# Patient Record
Sex: FEMALE | Race: WHITE | NOT HISPANIC OR LATINO | Employment: UNEMPLOYED | URBAN - METROPOLITAN AREA
[De-identification: names, ages, dates, MRNs, and addresses within clinical notes are randomized per-mention and may not be internally consistent; named-entity substitution may affect disease eponyms.]

---

## 2018-07-10 ENCOUNTER — HISTORICAL (OUTPATIENT)
Dept: LAB | Facility: HOSPITAL | Age: 33
End: 2018-07-10

## 2018-07-11 ENCOUNTER — HISTORICAL (OUTPATIENT)
Dept: LAB | Facility: HOSPITAL | Age: 33
End: 2018-07-11

## 2018-07-23 ENCOUNTER — HISTORICAL (OUTPATIENT)
Dept: RADIOLOGY | Facility: HOSPITAL | Age: 33
End: 2018-07-23

## 2018-08-07 ENCOUNTER — HISTORICAL (OUTPATIENT)
Dept: RADIOLOGY | Facility: HOSPITAL | Age: 33
End: 2018-08-07

## 2018-08-09 ENCOUNTER — HISTORICAL (OUTPATIENT)
Dept: LAB | Facility: HOSPITAL | Age: 33
End: 2018-08-09

## 2018-12-10 ENCOUNTER — HISTORICAL (OUTPATIENT)
Dept: RADIOLOGY | Facility: HOSPITAL | Age: 33
End: 2018-12-10

## 2019-07-10 ENCOUNTER — HISTORICAL (OUTPATIENT)
Dept: RADIOLOGY | Facility: HOSPITAL | Age: 34
End: 2019-07-10

## 2019-07-25 ENCOUNTER — HISTORICAL (OUTPATIENT)
Dept: RADIOLOGY | Facility: HOSPITAL | Age: 34
End: 2019-07-25

## 2019-08-09 ENCOUNTER — HISTORICAL (OUTPATIENT)
Dept: ANESTHESIOLOGY | Facility: HOSPITAL | Age: 34
End: 2019-08-09

## 2019-10-23 ENCOUNTER — HISTORICAL (OUTPATIENT)
Dept: RADIOLOGY | Facility: HOSPITAL | Age: 34
End: 2019-10-23

## 2020-02-27 ENCOUNTER — HISTORICAL (OUTPATIENT)
Dept: RADIOLOGY | Facility: HOSPITAL | Age: 35
End: 2020-02-27

## 2020-03-11 ENCOUNTER — HISTORICAL (OUTPATIENT)
Dept: RADIOLOGY | Facility: HOSPITAL | Age: 35
End: 2020-03-11

## 2020-12-17 ENCOUNTER — HISTORICAL (OUTPATIENT)
Dept: ADMINISTRATIVE | Facility: HOSPITAL | Age: 35
End: 2020-12-17

## 2021-12-20 ENCOUNTER — HISTORICAL (OUTPATIENT)
Dept: ADMINISTRATIVE | Facility: HOSPITAL | Age: 36
End: 2021-12-20

## 2022-01-31 LAB
HUMAN PAPILLOMAVIRUS (HPV): NORMAL
PAP RECOMMENDATION EXT: NORMAL
PAP SMEAR: NORMAL

## 2022-04-09 ENCOUNTER — HISTORICAL (OUTPATIENT)
Dept: ADMINISTRATIVE | Facility: HOSPITAL | Age: 37
End: 2022-04-09

## 2022-04-25 VITALS
SYSTOLIC BLOOD PRESSURE: 110 MMHG | BODY MASS INDEX: 39.78 KG/M2 | HEIGHT: 64 IN | DIASTOLIC BLOOD PRESSURE: 78 MMHG | WEIGHT: 233 LBS

## 2022-09-20 ENCOUNTER — HISTORICAL (OUTPATIENT)
Dept: ADMINISTRATIVE | Facility: HOSPITAL | Age: 37
End: 2022-09-20

## 2022-09-20 LAB — BCS RECOMMENDATION EXT: NORMAL

## 2022-10-06 ENCOUNTER — HISTORICAL (OUTPATIENT)
Dept: ADMINISTRATIVE | Facility: HOSPITAL | Age: 37
End: 2022-10-06

## 2023-01-17 ENCOUNTER — DOCUMENTATION ONLY (OUTPATIENT)
Dept: ADMINISTRATIVE | Facility: HOSPITAL | Age: 38
End: 2023-01-17
Payer: MEDICAID

## 2023-03-10 ENCOUNTER — OFFICE VISIT (OUTPATIENT)
Dept: OBSTETRICS AND GYNECOLOGY | Facility: CLINIC | Age: 38
End: 2023-03-10
Payer: MEDICAID

## 2023-03-10 VITALS
SYSTOLIC BLOOD PRESSURE: 128 MMHG | HEIGHT: 64 IN | WEIGHT: 232.63 LBS | DIASTOLIC BLOOD PRESSURE: 72 MMHG | BODY MASS INDEX: 39.72 KG/M2 | TEMPERATURE: 98 F

## 2023-03-10 DIAGNOSIS — Z80.0 FAMILY HISTORY OF COLON CANCER: ICD-10-CM

## 2023-03-10 DIAGNOSIS — Z01.411 ABNORMAL GYNECOLOGICAL EXAMINATION: Primary | ICD-10-CM

## 2023-03-10 DIAGNOSIS — N60.12 BILATERAL FIBROCYSTIC BREAST CHANGES: ICD-10-CM

## 2023-03-10 DIAGNOSIS — Z80.49 FAMILY HISTORY OF UTERINE CANCER: ICD-10-CM

## 2023-03-10 DIAGNOSIS — Z98.890 S/P ENDOMETRIAL ABLATION: ICD-10-CM

## 2023-03-10 DIAGNOSIS — Z98.51 S/P TUBAL LIGATION: ICD-10-CM

## 2023-03-10 DIAGNOSIS — Z12.4 CERVICAL CANCER SCREENING: ICD-10-CM

## 2023-03-10 DIAGNOSIS — N60.11 BILATERAL FIBROCYSTIC BREAST CHANGES: ICD-10-CM

## 2023-03-10 DIAGNOSIS — Z80.3 FAMILY HISTORY OF BREAST CANCER: ICD-10-CM

## 2023-03-10 PROCEDURE — 1160F PR REVIEW ALL MEDS BY PRESCRIBER/CLIN PHARMACIST DOCUMENTED: ICD-10-PCS | Mod: CPTII,,, | Performed by: NURSE PRACTITIONER

## 2023-03-10 PROCEDURE — 3078F PR MOST RECENT DIASTOLIC BLOOD PRESSURE < 80 MM HG: ICD-10-PCS | Mod: CPTII,,, | Performed by: NURSE PRACTITIONER

## 2023-03-10 PROCEDURE — 99395 PR PREVENTIVE VISIT,EST,18-39: ICD-10-PCS | Mod: ,,, | Performed by: NURSE PRACTITIONER

## 2023-03-10 PROCEDURE — 1159F MED LIST DOCD IN RCRD: CPT | Mod: CPTII,,, | Performed by: NURSE PRACTITIONER

## 2023-03-10 PROCEDURE — 1160F RVW MEDS BY RX/DR IN RCRD: CPT | Mod: CPTII,,, | Performed by: NURSE PRACTITIONER

## 2023-03-10 PROCEDURE — 3008F BODY MASS INDEX DOCD: CPT | Mod: CPTII,,, | Performed by: NURSE PRACTITIONER

## 2023-03-10 PROCEDURE — 3078F DIAST BP <80 MM HG: CPT | Mod: CPTII,,, | Performed by: NURSE PRACTITIONER

## 2023-03-10 PROCEDURE — 3074F SYST BP LT 130 MM HG: CPT | Mod: CPTII,,, | Performed by: NURSE PRACTITIONER

## 2023-03-10 PROCEDURE — 1159F PR MEDICATION LIST DOCUMENTED IN MEDICAL RECORD: ICD-10-PCS | Mod: CPTII,,, | Performed by: NURSE PRACTITIONER

## 2023-03-10 PROCEDURE — 3008F PR BODY MASS INDEX (BMI) DOCUMENTED: ICD-10-PCS | Mod: CPTII,,, | Performed by: NURSE PRACTITIONER

## 2023-03-10 PROCEDURE — 3074F PR MOST RECENT SYSTOLIC BLOOD PRESSURE < 130 MM HG: ICD-10-PCS | Mod: CPTII,,, | Performed by: NURSE PRACTITIONER

## 2023-03-10 PROCEDURE — 99395 PREV VISIT EST AGE 18-39: CPT | Mod: ,,, | Performed by: NURSE PRACTITIONER

## 2023-03-10 NOTE — PROGRESS NOTES
Chief Complaint: Annual exam    Chief Complaint   Patient presents with    Well Woman       HPI:   37 y.o. WF  s/p tubal and ablation, presents for an annual gyn exam.  S/p left breast bx, benign with Dr Tao .  No complaints today.    LMP:  no menses with Ablation.  Gardasil: x0    FmHx: +breast in MGM, + colon cancer in PGF, and +uterine cancer in mother, negative for ovarian cancer.     Labs / Significant Studies:  Last pap  WNL  Last MMG 2022 benign    Family History   Problem Relation Age of Onset    Uterine cancer Mother     Skin cancer Mother          History reviewed. No pertinent past medical history.  Past Surgical History:   Procedure Laterality Date    BREAST FIBROADENOMA SURGERY       SECTION      x2    DILATION AND CURETTAGE OF UTERUS      x2    KNEE CARTILAGE SURGERY      TONSILLECTOMY AND ADENOIDECTOMY       No current outpatient medications on file.    Review of patient's allergies indicates:  No Known Allergies    Social History     Tobacco Use    Smoking status: Never    Smokeless tobacco: Never   Substance Use Topics    Alcohol use: Yes    Drug use: Never         Review of Systems   Constitutional:  Negative for appetite change, chills, fatigue, fever and unexpected weight change.   Respiratory:  Negative for cough, shortness of breath and wheezing.    Cardiovascular:  Negative for chest pain, palpitations and leg swelling.   Gastrointestinal:  Negative for abdominal pain, blood in stool, constipation, diarrhea, nausea, vomiting and reflux.   Endocrine: Negative for diabetes, hair loss, hot flashes, hyperthyroidism and hypothyroidism.   Genitourinary:  Negative for bladder incontinence, decreased libido, dysmenorrhea, dyspareunia, dysuria, flank pain, frequency, genital sores, hematuria, hot flashes, menorrhagia, menstrual problem, pelvic pain, urgency, vaginal bleeding, vaginal discharge, vaginal pain, urinary incontinence, postcoital bleeding,  "postmenopausal bleeding, vaginal dryness and vaginal odor.   Integumentary:  Negative for rash, acne, hair changes, mole/lesion, breast mass, nipple discharge, breast skin changes and breast tenderness.   Neurological:  Negative for headaches.   Psychiatric/Behavioral:  Negative for depression and sleep disturbance. The patient is not nervous/anxious.    Breast: Negative for lump, mass, mastodynia, nipple discharge, skin changes and tenderness     Physical Exam:   Vitals:    03/10/23 0948   BP: 128/72   BP Location: Right arm   Temp: 97.5 °F (36.4 °C)   Weight: 105.5 kg (232 lb 9.6 oz)   Height: 5' 3.78" (1.62 m)       Body mass index is 40.2 kg/m².    Physical Exam  Constitutional:       Appearance: She is well-developed.   Neck:      Thyroid: No thyroid mass or thyroid tenderness.   Cardiovascular:      Rate and Rhythm: Normal rate and regular rhythm.      Pulses: Normal pulses.      Heart sounds: Normal heart sounds. No murmur heard.  Pulmonary:      Effort: No respiratory distress or retractions.      Breath sounds: Normal breath sounds. No decreased breath sounds, wheezing, rhonchi or rales.   Chest:   Breasts:     Right: No inverted nipple, mass, nipple discharge, skin change or tenderness.      Left: No inverted nipple, mass, nipple discharge, skin change or tenderness.      Comments: +fibrocystic changes  Abdominal:      General: Bowel sounds are normal.      Palpations: There is no mass.      Tenderness: There is no abdominal tenderness.      Hernia: No hernia is present.   Genitourinary:     Vagina: Normal. No vaginal discharge, erythema or tenderness.      Cervix: No discharge or friability.      Uterus: Not deviated, not enlarged, not fixed and not tender.       Adnexa:         Right: No mass, tenderness or fullness.          Left: No mass, tenderness or fullness.        Rectum: No tenderness or external hemorrhoid.   Musculoskeletal:      Cervical back: No edema.      Right lower leg: No edema.      " Left lower leg: No edema.   Lymphadenopathy:      Head:      Right side of head: No submandibular or preauricular adenopathy.      Left side of head: No submandibular or preauricular adenopathy.      Upper Body:      Right upper body: No supraclavicular or axillary adenopathy.      Left upper body: No supraclavicular or axillary adenopathy.   Skin:     General: Skin is warm and dry.      Coloration: Skin is not pale.      Findings: No erythema or rash.   Neurological:      Mental Status: She is alert and oriented to person, place, and time.   Psychiatric:         Mood and Affect: Mood normal. Mood is not anxious or depressed.         Behavior: Behavior normal.         Thought Content: Thought content normal. Thought content does not include homicidal or suicidal ideation. Thought content does not include homicidal or suicidal plan.       Assessment:     There is no problem list on file for this patient.      Health Maintenance Due   Topic Date Due    Hepatitis C Screening  Never done    Lipid Panel  Never done    HIV Screening  Never done    TETANUS VACCINE  Never done    Hemoglobin A1c (Diabetic Prevention Screening)  Never done    COVID-19 Vaccine (4 - Booster for Pfizer series) 04/28/2022    Influenza Vaccine (1) Never done     Health Maintenance Topics with due status: Not Due       Topic Last Completion Date    Cervical Cancer Screening 01/31/2022         Plan:    Luz was seen today for well woman.    Diagnoses and all orders for this visit:    Abnormal gynecological examination  -     Mammo Digital Screening Bilat w/ Zheng; Future  PAP  Counseled regarding safe sex practices and prevention of STD's .  Discussed contraception options.  Advised avoidance of tobacco, alcohol, and drugs.  Discussed breast self-awareness  Seat belt  Multivitamin, Ca/Vit D  Healthy diet and exercise  RTC 1 yr    Family history of breast cancer  -     Mammo Digital Screening Bilat w/ Zheng; Future  - Discussed recommendations of  annual screening after age of 40 with mammogram and MRI for patients with lifetime risk greater than 25%     - Explained that screening is not 100% reliable.  Advised patient if she notices any changes to her breast including a lump, mass, dimpling, discharge, rash, or tenderness she should contact us immediately.       Bilateral fibrocystic breast changes  -     Mammo Digital Screening Bilat w/ Zheng; Future  - Recommend monthly BSE     Family history of colon cancer  - Educated    - Advised pt to continue with Colonoscopy per PCP.     Family history of uterine cancer    S/P tubal ligation    S/P endometrial ablation    Cervical cancer screening        Marilee Diaz

## 2023-03-15 LAB
AGE GDLN ACOG TESTING: NORMAL
CYTOLOGIST CVX/VAG CYTO: NORMAL
CYTOLOGY CVX/VAG DOC CYTO: NORMAL
CYTOLOGY CVX/VAG DOC THIN PREP: NORMAL
DX ICD CODE: NORMAL
HPV I/H RISK 4 DNA CVX QL PROBE+SIG AMP: NEGATIVE
LC HPV GENOTYPE REFLEX: NORMAL
Lab: NORMAL
OTHER STN SPEC: NORMAL
STAT OF ADQ CVX/VAG CYTO-IMP: NORMAL

## 2023-03-16 ENCOUNTER — PATIENT MESSAGE (OUTPATIENT)
Dept: OBSTETRICS AND GYNECOLOGY | Facility: CLINIC | Age: 38
End: 2023-03-16
Payer: MEDICAID

## 2023-03-16 ENCOUNTER — HOSPITAL ENCOUNTER (OUTPATIENT)
Dept: RADIOLOGY | Facility: HOSPITAL | Age: 38
Discharge: HOME | End: 2023-03-16
Attending: NURSE PRACTITIONER
Payer: MEDICAID

## 2023-03-16 ENCOUNTER — TELEPHONE (OUTPATIENT)
Dept: OBSTETRICS AND GYNECOLOGY | Facility: CLINIC | Age: 38
End: 2023-03-16
Payer: MEDICAID

## 2023-03-16 VITALS — HEIGHT: 62 IN | WEIGHT: 231 LBS | BODY MASS INDEX: 42.51 KG/M2

## 2023-03-16 DIAGNOSIS — Z80.3 FAMILY HISTORY OF BREAST CANCER: ICD-10-CM

## 2023-03-16 DIAGNOSIS — N60.11 BILATERAL FIBROCYSTIC BREAST CHANGES: ICD-10-CM

## 2023-03-16 DIAGNOSIS — Z01.411 ABNORMAL GYNECOLOGICAL EXAMINATION: ICD-10-CM

## 2023-03-16 DIAGNOSIS — N60.12 BILATERAL FIBROCYSTIC BREAST CHANGES: ICD-10-CM

## 2023-03-16 PROCEDURE — 77067 SCR MAMMO BI INCL CAD: CPT | Mod: TC

## 2023-03-16 NOTE — TELEPHONE ENCOUNTER
----- Message from Katherine Aquilino sent at 3/16/2023  9:40 AM CDT -----  Regarding: Results  Contact: Luz Fenton received her results on her pap through her esteban. She is asking if a nurse could call her back, and if Mrs Hunt reviewed them.   Call bethany [phone] (can call pt or message through pt portal)

## 2023-07-28 ENCOUNTER — LAB VISIT (OUTPATIENT)
Dept: LAB | Facility: HOSPITAL | Age: 38
End: 2023-07-28
Attending: PEDIATRICS
Payer: MEDICAID

## 2023-07-28 DIAGNOSIS — E78.00 PURE HYPERCHOLESTEROLEMIA: Primary | ICD-10-CM

## 2023-07-28 LAB
ALBUMIN SERPL-MCNC: 3.8 G/DL (ref 3.5–5)
ALBUMIN/GLOB SERPL: 1.2 RATIO (ref 1.1–2)
ALP SERPL-CCNC: 73 UNIT/L (ref 40–150)
ALT SERPL-CCNC: 44 UNIT/L (ref 0–55)
APPEARANCE UR: CLEAR
AST SERPL-CCNC: 26 UNIT/L (ref 5–34)
BASOPHILS # BLD AUTO: 0.03 X10(3)/MCL
BASOPHILS NFR BLD AUTO: 0.4 %
BILIRUB UR QL STRIP.AUTO: NEGATIVE
BILIRUBIN DIRECT+TOT PNL SERPL-MCNC: 0.3 MG/DL
BUN SERPL-MCNC: 8 MG/DL (ref 7–18.7)
CALCIUM SERPL-MCNC: 9.2 MG/DL (ref 8.4–10.2)
CHLORIDE SERPL-SCNC: 105 MMOL/L (ref 98–107)
CHOLEST SERPL-MCNC: 213 MG/DL
CHOLEST/HDLC SERPL: 5 {RATIO} (ref 0–5)
CO2 SERPL-SCNC: 27 MMOL/L (ref 22–29)
COLOR UR: YELLOW
CREAT SERPL-MCNC: 0.72 MG/DL (ref 0.55–1.02)
DEPRECATED CALCIDIOL+CALCIFEROL SERPL-MC: 40.5 NG/ML (ref 30–80)
EOSINOPHIL # BLD AUTO: 0.29 X10(3)/MCL (ref 0–0.9)
EOSINOPHIL NFR BLD AUTO: 3.5 %
ERYTHROCYTE [DISTWIDTH] IN BLOOD BY AUTOMATED COUNT: 12.8 % (ref 11.5–17)
EST. AVERAGE GLUCOSE BLD GHB EST-MCNC: 93.9 MG/DL
GFR SERPLBLD CREATININE-BSD FMLA CKD-EPI: >60 MLS/MIN/1.73/M2
GLOBULIN SER-MCNC: 3.3 GM/DL (ref 2.4–3.5)
GLUCOSE SERPL-MCNC: 90 MG/DL (ref 74–100)
GLUCOSE UR QL STRIP.AUTO: NEGATIVE
HBA1C MFR BLD: 4.9 %
HCT VFR BLD AUTO: 39.8 % (ref 37–47)
HDLC SERPL-MCNC: 44 MG/DL (ref 35–60)
HGB BLD-MCNC: 13.2 G/DL (ref 12–16)
IMM GRANULOCYTES # BLD AUTO: 0.03 X10(3)/MCL (ref 0–0.04)
IMM GRANULOCYTES NFR BLD AUTO: 0.4 %
KETONES UR QL STRIP.AUTO: NEGATIVE
LDLC SERPL CALC-MCNC: 134 MG/DL (ref 50–140)
LEUKOCYTE ESTERASE UR QL STRIP.AUTO: NEGATIVE
LYMPHOCYTES # BLD AUTO: 3.04 X10(3)/MCL (ref 0.6–4.6)
LYMPHOCYTES NFR BLD AUTO: 36.8 %
MCH RBC QN AUTO: 28.4 PG (ref 27–31)
MCHC RBC AUTO-ENTMCNC: 33.2 G/DL (ref 33–36)
MCV RBC AUTO: 85.8 FL (ref 80–94)
MONOCYTES # BLD AUTO: 0.53 X10(3)/MCL (ref 0.1–1.3)
MONOCYTES NFR BLD AUTO: 6.4 %
NEUTROPHILS # BLD AUTO: 4.35 X10(3)/MCL (ref 2.1–9.2)
NEUTROPHILS NFR BLD AUTO: 52.5 %
NITRITE UR QL STRIP.AUTO: NEGATIVE
PH UR STRIP.AUTO: 5.5 [PH]
PLATELET # BLD AUTO: 319 X10(3)/MCL (ref 130–400)
PMV BLD AUTO: 10.9 FL (ref 7.4–10.4)
POTASSIUM SERPL-SCNC: 4 MMOL/L (ref 3.5–5.1)
PROT SERPL-MCNC: 7.1 GM/DL (ref 6.4–8.3)
PROT UR QL STRIP.AUTO: NEGATIVE
RBC # BLD AUTO: 4.64 X10(6)/MCL (ref 4.2–5.4)
RBC UR QL AUTO: NEGATIVE
SODIUM SERPL-SCNC: 139 MMOL/L (ref 136–145)
SP GR UR STRIP.AUTO: 1.02
T4 FREE SERPL-MCNC: 1.03 NG/DL (ref 0.7–1.48)
TRIGL SERPL-MCNC: 176 MG/DL (ref 37–140)
TSH SERPL-ACNC: 2.31 UIU/ML (ref 0.35–4.94)
UROBILINOGEN UR STRIP-ACNC: 0.2
VLDLC SERPL CALC-MCNC: 35 MG/DL
WBC # SPEC AUTO: 8.27 X10(3)/MCL (ref 4.5–11.5)

## 2023-07-28 PROCEDURE — 85025 COMPLETE CBC W/AUTO DIFF WBC: CPT

## 2023-07-28 PROCEDURE — 36415 COLL VENOUS BLD VENIPUNCTURE: CPT

## 2023-07-28 PROCEDURE — 81003 URINALYSIS AUTO W/O SCOPE: CPT

## 2023-07-28 PROCEDURE — 80053 COMPREHEN METABOLIC PANEL: CPT

## 2023-07-28 PROCEDURE — 80061 LIPID PANEL: CPT

## 2023-07-28 PROCEDURE — 84443 ASSAY THYROID STIM HORMONE: CPT

## 2023-07-28 PROCEDURE — 82306 VITAMIN D 25 HYDROXY: CPT

## 2023-07-28 PROCEDURE — 84439 ASSAY OF FREE THYROXINE: CPT

## 2023-07-28 PROCEDURE — 83036 HEMOGLOBIN GLYCOSYLATED A1C: CPT

## 2024-01-30 ENCOUNTER — LAB VISIT (OUTPATIENT)
Dept: LAB | Facility: HOSPITAL | Age: 39
End: 2024-01-30
Attending: FAMILY MEDICINE
Payer: MEDICAID

## 2024-01-30 DIAGNOSIS — R03.0 ELEVATED BLOOD PRESSURE READING WITHOUT DIAGNOSIS OF HYPERTENSION: Primary | ICD-10-CM

## 2024-01-30 LAB
ALBUMIN SERPL-MCNC: 4 G/DL (ref 3.5–5)
ALBUMIN/GLOB SERPL: 1.4 RATIO (ref 1.1–2)
ALP SERPL-CCNC: 52 UNIT/L (ref 40–150)
ALT SERPL-CCNC: 28 UNIT/L (ref 0–55)
AST SERPL-CCNC: 19 UNIT/L (ref 5–34)
BASOPHILS # BLD AUTO: 0.03 X10(3)/MCL
BASOPHILS NFR BLD AUTO: 0.5 %
BILIRUB SERPL-MCNC: 0.6 MG/DL
BUN SERPL-MCNC: 7 MG/DL (ref 7–18.7)
CALCIUM SERPL-MCNC: 8.7 MG/DL (ref 8.4–10.2)
CHLORIDE SERPL-SCNC: 105 MMOL/L (ref 98–107)
CHOLEST SERPL-MCNC: 196 MG/DL
CHOLEST/HDLC SERPL: 5 {RATIO} (ref 0–5)
CO2 SERPL-SCNC: 29 MMOL/L (ref 22–29)
CREAT SERPL-MCNC: 0.65 MG/DL (ref 0.55–1.02)
EOSINOPHIL # BLD AUTO: 0.19 X10(3)/MCL (ref 0–0.9)
EOSINOPHIL NFR BLD AUTO: 3.2 %
ERYTHROCYTE [DISTWIDTH] IN BLOOD BY AUTOMATED COUNT: 12 % (ref 11.5–17)
GFR SERPLBLD CREATININE-BSD FMLA CKD-EPI: >60 MLS/MIN/1.73/M2
GLOBULIN SER-MCNC: 2.8 GM/DL (ref 2.4–3.5)
GLUCOSE SERPL-MCNC: 86 MG/DL (ref 74–100)
HCT VFR BLD AUTO: 36 % (ref 37–47)
HDLC SERPL-MCNC: 43 MG/DL (ref 35–60)
HGB BLD-MCNC: 13 G/DL (ref 12–16)
IMM GRANULOCYTES # BLD AUTO: 0.02 X10(3)/MCL (ref 0–0.04)
IMM GRANULOCYTES NFR BLD AUTO: 0.3 %
LDLC SERPL CALC-MCNC: 135 MG/DL (ref 50–140)
LYMPHOCYTES # BLD AUTO: 2.27 X10(3)/MCL (ref 0.6–4.6)
LYMPHOCYTES NFR BLD AUTO: 38.5 %
MCH RBC QN AUTO: 29 PG (ref 27–31)
MCHC RBC AUTO-ENTMCNC: 36.1 G/DL (ref 33–36)
MCV RBC AUTO: 80.4 FL (ref 80–94)
MONOCYTES # BLD AUTO: 0.45 X10(3)/MCL (ref 0.1–1.3)
MONOCYTES NFR BLD AUTO: 7.6 %
NEUTROPHILS # BLD AUTO: 2.93 X10(3)/MCL (ref 2.1–9.2)
NEUTROPHILS NFR BLD AUTO: 49.9 %
PLATELET # BLD AUTO: 262 X10(3)/MCL (ref 130–400)
PMV BLD AUTO: 11.3 FL (ref 7.4–10.4)
POTASSIUM SERPL-SCNC: 3.7 MMOL/L (ref 3.5–5.1)
PROT SERPL-MCNC: 6.8 GM/DL (ref 6.4–8.3)
RBC # BLD AUTO: 4.48 X10(6)/MCL (ref 4.2–5.4)
SODIUM SERPL-SCNC: 140 MMOL/L (ref 136–145)
TRIGL SERPL-MCNC: 92 MG/DL (ref 37–140)
TSH SERPL-ACNC: 2.15 UIU/ML (ref 0.35–4.94)
VLDLC SERPL CALC-MCNC: 18 MG/DL
WBC # SPEC AUTO: 5.89 X10(3)/MCL (ref 4.5–11.5)

## 2024-01-30 PROCEDURE — 80061 LIPID PANEL: CPT

## 2024-01-30 PROCEDURE — 36415 COLL VENOUS BLD VENIPUNCTURE: CPT

## 2024-01-30 PROCEDURE — 85025 COMPLETE CBC W/AUTO DIFF WBC: CPT

## 2024-01-30 PROCEDURE — 80053 COMPREHEN METABOLIC PANEL: CPT

## 2024-01-30 PROCEDURE — 84443 ASSAY THYROID STIM HORMONE: CPT

## 2024-02-21 NOTE — PROGRESS NOTES
Chief Complaint:     Chief Complaint   Patient presents with    Breast Pain     L and R breast lump x 1 month. Reports has intermittent L breast tenderness when palpated. Denies pain or tenderness of R breast . Last pap: 03/10/2023 NIL -HPV. MM2023 benign. S/p tubal and ablation.          HPI:   38 y.o.  female   presents with c/o lumps to both breasts x 1 month.  MGM with hx breast cancer.  Last MMG 3/23. Hx left breast bx , benign.   Hx fibroadenoma left breast, unsure of year.    LMP: S/p ablation  Frequency: N/A  Cycle length: N/A  Flow: N/A  Intermenstrual bleeding: No  Postcoital bleeding: No  Dysmenorrhea: N/A  Sexually active: Yes  Dyspareunia: No  Contraception: S/p tubal ligation and ablation  H/o STI: No   Last pap: 03/10/2023 NIL -HPV  H/o abnl pap: No  Colposcopy:       Current Outpatient Medications:     topiramate (TOPAMAX) 25 MG tablet, Take 25 mg by mouth 2 (two) times daily., Disp: , Rfl:     valsartan (DIOVAN) 80 MG tablet, Take 80 mg by mouth., Disp: , Rfl:     phentermine (ADIPEX-P) 37.5 mg tablet, Take 37.5 mg by mouth before breakfast., Disp: , Rfl:     Review of patient's allergies indicates:  No Known Allergies    Social History     Tobacco Use    Smoking status: Never    Smokeless tobacco: Never   Substance Use Topics    Alcohol use: Yes    Drug use: Never       Review of Systems:   General/Constitutional: Chills denies. Fatigue/weakness denies. Fever denies. Night sweats denies. Hot flashes denies    Respiratory: Cough denies. Hemoptysis denies. SOB denies. Sputum production denies. Wheezing denies .   Cardiovascular: Chest pain denies . Dizziness denies. Palpitations denies. Swelling in hands/feet denies    Gastrointestinal: Abdominal pain denies. Blood in stool denies. Constipation denies. Diarrhea denies. Heartburn denies. Nausea denies. Vomiting denies    Genitourinary: Incontinence denies. Blood in urine denies. Frequent urination denies. Painful urination denies.  "Urinary urgency denies. Nocturia denies    Gynecologic: Irregular menses denies. Heavy bleeding denies. Painful menses denies. Vaginal discharge denies. Vaginal odor denies. Vaginal itching denies. Vaginal lesion denies. Pelvic pain denies. Decreased libido denies. Vulvar lesion denies. Prolapse of genital organs denies. Painful intercourse denies. Postcoital bleeding denies    Psychiatric: Depression denies. Anxiety denies       Physical Exam:   Vitals:    02/22/24 0808   BP: 114/76   BP Location: Right arm   Patient Position: Sitting   BP Method: Medium (Manual)   Weight: 93.4 kg (206 lb)   Height: 5' 4" (1.626 m)       Body mass index is 35.36 kg/m².    Physical Exam  Chest:   Breasts:     Breasts are symmetrical.      Right: No inverted nipple, mass, nipple discharge, skin change or tenderness.      Left: No inverted nipple, mass, nipple discharge, skin change or tenderness.      Comments: + fibrocystic changes noted bilaterally            Assessment:     There is no problem list on file for this patient.      Health Maintenance Due   Topic Date Due    Hepatitis C Screening  Never done    HIV Screening  Never done    TETANUS VACCINE  Never done    Influenza Vaccine (1) Never done    COVID-19 Vaccine (4 - 2023-24 season) 09/01/2023     Health Maintenance Topics with due status: Not Due       Topic Last Completion Date    Cervical Cancer Screening 03/10/2023    Hemoglobin A1c (Diabetic Prevention Screening) 07/28/2023           Plan:    Luz was seen today for breast pain.    Diagnoses and all orders for this visit:    Bilateral fibrocystic breast disease (FCBD)  - Discussed recommendations of annual screening after age of 40 with mammogram and MRI for patients with lifetime risk greater than 25%     - Explained that screening is not 100% reliable.  Advised patient if she notices any changes to her breast including a lump, mass, dimpling, discharge, rash, or tenderness she should contact us immediately.     - " Recommend monthly BSE      - Advised pt to decrease caffeine intake (e.g. soda, coffee, tea, chocolate, etc.)    - Advised pt to use Aspercreme PRN.     Family history of breast cancer    Mastodynia of left breast  Aspercreme prn  Discussed with patient treatment options including compression/sports bra, vitamin E, decrease caffeine intake, NSAIDs, and primrose oil.        Explained that screening is not 100% reliable.  Advised patient if she notices any changes to her breast including a lump, mass, dimpling, discharge, rash, or tenderness she should contact us immediately.          Diagnostic MMG and u/s - orders sent  RTC 1 month for annual and breast f/u

## 2024-02-22 ENCOUNTER — OFFICE VISIT (OUTPATIENT)
Dept: OBSTETRICS AND GYNECOLOGY | Facility: CLINIC | Age: 39
End: 2024-02-22
Payer: MEDICAID

## 2024-02-22 VITALS
BODY MASS INDEX: 35.17 KG/M2 | DIASTOLIC BLOOD PRESSURE: 76 MMHG | WEIGHT: 206 LBS | HEIGHT: 64 IN | SYSTOLIC BLOOD PRESSURE: 114 MMHG

## 2024-02-22 DIAGNOSIS — N64.4 MASTODYNIA OF LEFT BREAST: ICD-10-CM

## 2024-02-22 DIAGNOSIS — Z80.3 FAMILY HISTORY OF BREAST CANCER: ICD-10-CM

## 2024-02-22 DIAGNOSIS — N60.12 BILATERAL FIBROCYSTIC BREAST DISEASE (FCBD): Primary | ICD-10-CM

## 2024-02-22 DIAGNOSIS — N60.11 BILATERAL FIBROCYSTIC BREAST DISEASE (FCBD): Primary | ICD-10-CM

## 2024-02-22 PROCEDURE — 3008F BODY MASS INDEX DOCD: CPT | Mod: CPTII,,, | Performed by: NURSE PRACTITIONER

## 2024-02-22 PROCEDURE — 1159F MED LIST DOCD IN RCRD: CPT | Mod: CPTII,,, | Performed by: NURSE PRACTITIONER

## 2024-02-22 PROCEDURE — 3074F SYST BP LT 130 MM HG: CPT | Mod: CPTII,,, | Performed by: NURSE PRACTITIONER

## 2024-02-22 PROCEDURE — 99213 OFFICE O/P EST LOW 20 MIN: CPT | Mod: ,,, | Performed by: NURSE PRACTITIONER

## 2024-02-22 PROCEDURE — 4010F ACE/ARB THERAPY RXD/TAKEN: CPT | Mod: CPTII,,, | Performed by: NURSE PRACTITIONER

## 2024-02-22 PROCEDURE — 3078F DIAST BP <80 MM HG: CPT | Mod: CPTII,,, | Performed by: NURSE PRACTITIONER

## 2024-02-22 PROCEDURE — 1160F RVW MEDS BY RX/DR IN RCRD: CPT | Mod: CPTII,,, | Performed by: NURSE PRACTITIONER

## 2024-02-22 RX ORDER — PHENTERMINE HYDROCHLORIDE 37.5 MG/1
37.5 TABLET ORAL
COMMUNITY

## 2024-02-22 RX ORDER — TOPIRAMATE 25 MG/1
25 TABLET ORAL 2 TIMES DAILY
COMMUNITY
Start: 2023-10-31 | End: 2024-05-03

## 2024-02-22 RX ORDER — VALSARTAN 80 MG/1
80 TABLET ORAL
COMMUNITY
Start: 2024-01-25

## 2024-02-25 ENCOUNTER — HOSPITAL ENCOUNTER (EMERGENCY)
Facility: HOSPITAL | Age: 39
Discharge: HOME | End: 2024-02-25
Payer: MEDICAID

## 2024-02-25 VITALS
WEIGHT: 210.88 LBS | HEIGHT: 64 IN | RESPIRATION RATE: 20 BRPM | OXYGEN SATURATION: 95 % | DIASTOLIC BLOOD PRESSURE: 67 MMHG | SYSTOLIC BLOOD PRESSURE: 112 MMHG | HEART RATE: 95 BPM | TEMPERATURE: 98 F | BODY MASS INDEX: 36 KG/M2

## 2024-02-25 DIAGNOSIS — R10.13 EPIGASTRIC PAIN: ICD-10-CM

## 2024-02-25 DIAGNOSIS — N30.00 ACUTE CYSTITIS WITHOUT HEMATURIA: Primary | ICD-10-CM

## 2024-02-25 LAB
ALBUMIN SERPL-MCNC: 4.5 G/DL (ref 3.4–5)
ALBUMIN/GLOB SERPL: 1.5 RATIO
ALP SERPL-CCNC: 64 UNIT/L (ref 50–144)
ALT SERPL-CCNC: 43 UNIT/L (ref 1–45)
ANION GAP SERPL CALC-SCNC: 6 MEQ/L (ref 2–13)
APPEARANCE UR: ABNORMAL
AST SERPL-CCNC: 32 UNIT/L (ref 14–36)
B-HCG SERPL QL: NEGATIVE
BACTERIA #/AREA URNS AUTO: ABNORMAL /HPF
BASOPHILS # BLD AUTO: 0.03 X10(3)/MCL (ref 0.01–0.08)
BASOPHILS NFR BLD AUTO: 0.2 % (ref 0.1–1.2)
BILIRUB SERPL-MCNC: 0.7 MG/DL (ref 0–1)
BILIRUB UR QL STRIP.AUTO: NEGATIVE
BUN SERPL-MCNC: 9 MG/DL (ref 7–20)
CALCIUM SERPL-MCNC: 8.8 MG/DL (ref 8.4–10.2)
CHLORIDE SERPL-SCNC: 105 MMOL/L (ref 98–110)
CO2 SERPL-SCNC: 27 MMOL/L (ref 21–32)
COLOR UR AUTO: YELLOW
CREAT SERPL-MCNC: 0.68 MG/DL (ref 0.66–1.25)
CREAT/UREA NIT SERPL: 13 (ref 12–20)
EOSINOPHIL # BLD AUTO: 0.2 X10(3)/MCL (ref 0.04–0.36)
EOSINOPHIL NFR BLD AUTO: 1.3 % (ref 0.7–7)
ERYTHROCYTE [DISTWIDTH] IN BLOOD BY AUTOMATED COUNT: 12.2 % (ref 11–14.5)
GFR SERPLBLD CREATININE-BSD FMLA CKD-EPI: >90 MLS/MIN/1.73/M2
GLOBULIN SER-MCNC: 3.1 GM/DL (ref 2–3.9)
GLUCOSE SERPL-MCNC: 94 MG/DL (ref 70–115)
GLUCOSE UR QL STRIP.AUTO: NEGATIVE
HCT VFR BLD AUTO: 37 % (ref 36–48)
HGB BLD-MCNC: 13.1 G/DL (ref 11.8–16)
IMM GRANULOCYTES # BLD AUTO: 0.04 X10(3)/MCL (ref 0–0.03)
IMM GRANULOCYTES NFR BLD AUTO: 0.3 % (ref 0–0.5)
KETONES UR QL STRIP.AUTO: NEGATIVE
LEUKOCYTE ESTERASE UR QL STRIP.AUTO: ABNORMAL
LIPASE SERPL-CCNC: 159 U/L (ref 23–300)
LYMPHOCYTES # BLD AUTO: 2.37 X10(3)/MCL (ref 1.16–3.74)
LYMPHOCYTES NFR BLD AUTO: 15.3 % (ref 20–55)
MCH RBC QN AUTO: 29.9 PG (ref 27–34)
MCHC RBC AUTO-ENTMCNC: 35.4 G/DL (ref 31–37)
MCV RBC AUTO: 84.5 FL (ref 79–99)
MONOCYTES # BLD AUTO: 0.86 X10(3)/MCL (ref 0.24–0.36)
MONOCYTES NFR BLD AUTO: 5.6 % (ref 4.7–12.5)
NEUTROPHILS # BLD AUTO: 11.97 X10(3)/MCL (ref 1.56–6.13)
NEUTROPHILS NFR BLD AUTO: 77.3 % (ref 37–73)
NITRITE UR QL STRIP.AUTO: POSITIVE
NRBC BLD AUTO-RTO: 0 %
PH UR STRIP.AUTO: 6 [PH]
PLATELET # BLD AUTO: 284 X10(3)/MCL (ref 140–371)
PMV BLD AUTO: 10.6 FL (ref 9.4–12.4)
POTASSIUM SERPL-SCNC: 3.4 MMOL/L (ref 3.5–5.1)
PROT SERPL-MCNC: 7.6 GM/DL (ref 6.3–8.2)
PROT UR QL STRIP.AUTO: 100
RBC # BLD AUTO: 4.38 X10(6)/MCL (ref 4–5.1)
RBC #/AREA URNS AUTO: ABNORMAL /HPF
RBC UR QL AUTO: ABNORMAL
SODIUM SERPL-SCNC: 138 MMOL/L (ref 135–145)
SP GR UR STRIP.AUTO: >=1.03 (ref 1–1.03)
SQUAMOUS #/AREA URNS AUTO: ABNORMAL /HPF
UROBILINOGEN UR STRIP-ACNC: 1
WBC # SPEC AUTO: 15.47 X10(3)/MCL (ref 4–11.5)
WBC #/AREA URNS AUTO: >100 /HPF

## 2024-02-25 PROCEDURE — 81025 URINE PREGNANCY TEST: CPT

## 2024-02-25 PROCEDURE — 25500020 PHARM REV CODE 255

## 2024-02-25 PROCEDURE — 93010 ELECTROCARDIOGRAM REPORT: CPT | Mod: ,,, | Performed by: INTERNAL MEDICINE

## 2024-02-25 PROCEDURE — 99285 EMERGENCY DEPT VISIT HI MDM: CPT | Mod: 25

## 2024-02-25 PROCEDURE — 80053 COMPREHEN METABOLIC PANEL: CPT

## 2024-02-25 PROCEDURE — 83690 ASSAY OF LIPASE: CPT

## 2024-02-25 PROCEDURE — 87086 URINE CULTURE/COLONY COUNT: CPT

## 2024-02-25 PROCEDURE — 63600175 PHARM REV CODE 636 W HCPCS

## 2024-02-25 PROCEDURE — 93005 ELECTROCARDIOGRAM TRACING: CPT

## 2024-02-25 PROCEDURE — 25000003 PHARM REV CODE 250

## 2024-02-25 PROCEDURE — 85025 COMPLETE CBC W/AUTO DIFF WBC: CPT

## 2024-02-25 PROCEDURE — 81003 URINALYSIS AUTO W/O SCOPE: CPT

## 2024-02-25 PROCEDURE — 96365 THER/PROPH/DIAG IV INF INIT: CPT

## 2024-02-25 PROCEDURE — 96375 TX/PRO/DX INJ NEW DRUG ADDON: CPT

## 2024-02-25 RX ORDER — NITROFURANTOIN 25; 75 MG/1; MG/1
100 CAPSULE ORAL 2 TIMES DAILY
Qty: 10 CAPSULE | Refills: 0 | Status: SHIPPED | OUTPATIENT
Start: 2024-02-25 | End: 2024-03-01

## 2024-02-25 RX ORDER — KETOROLAC TROMETHAMINE 30 MG/ML
30 INJECTION, SOLUTION INTRAMUSCULAR; INTRAVENOUS
Status: COMPLETED | OUTPATIENT
Start: 2024-02-25 | End: 2024-02-25

## 2024-02-25 RX ORDER — PANTOPRAZOLE SODIUM 40 MG/1
40 TABLET, DELAYED RELEASE ORAL DAILY
Qty: 30 TABLET | Refills: 0 | Status: SHIPPED | OUTPATIENT
Start: 2024-02-25 | End: 2024-05-03

## 2024-02-25 RX ORDER — PROCHLORPERAZINE EDISYLATE 5 MG/ML
5 INJECTION INTRAMUSCULAR; INTRAVENOUS
Status: COMPLETED | OUTPATIENT
Start: 2024-02-25 | End: 2024-02-25

## 2024-02-25 RX ORDER — LORAZEPAM 2 MG/ML
0.5 INJECTION INTRAMUSCULAR
Status: COMPLETED | OUTPATIENT
Start: 2024-02-25 | End: 2024-02-25

## 2024-02-25 RX ORDER — HYDROMORPHONE HYDROCHLORIDE 1 MG/ML
0.5 INJECTION, SOLUTION INTRAMUSCULAR; INTRAVENOUS; SUBCUTANEOUS
Status: COMPLETED | OUTPATIENT
Start: 2024-02-25 | End: 2024-02-25

## 2024-02-25 RX ADMIN — KETOROLAC TROMETHAMINE 30 MG: 30 INJECTION, SOLUTION INTRAMUSCULAR; INTRAVENOUS at 02:02

## 2024-02-25 RX ADMIN — PIPERACILLIN AND TAZOBACTAM 4.5 G: 4; .5 INJECTION, POWDER, LYOPHILIZED, FOR SOLUTION INTRAVENOUS; PARENTERAL at 02:02

## 2024-02-25 RX ADMIN — HYDROMORPHONE HYDROCHLORIDE 0.5 MG: 1 INJECTION, SOLUTION INTRAMUSCULAR; INTRAVENOUS; SUBCUTANEOUS at 01:02

## 2024-02-25 RX ADMIN — LORAZEPAM 0.5 MG: 2 INJECTION INTRAMUSCULAR; INTRAVENOUS at 01:02

## 2024-02-25 RX ADMIN — PROCHLORPERAZINE EDISYLATE 5 MG: 5 INJECTION INTRAMUSCULAR; INTRAVENOUS at 01:02

## 2024-02-25 RX ADMIN — IOHEXOL 100 ML: 350 INJECTION, SOLUTION INTRAVENOUS at 02:02

## 2024-02-25 NOTE — DISCHARGE INSTRUCTIONS
Your pain could have been due to a gallbladder attack or your stomach (gastritis).  Tests that were done rule out dangerous causes, but do not rule out either of these problems.  Take the medications as prescribed.  Avoid fatty foods.  Follow up with your primary care physician for further testing, as they feel indicated.

## 2024-02-25 NOTE — ED PROVIDER NOTES
Encounter Date: 2024       History     Chief Complaint   Patient presents with    Abdominal Pain    Back Pain    Nausea     [place] she [place] having Abdominal pain radiating to her back and constant Nausea.     38-year-old female presents complaining of epigastric pain radiating to her back.  The pain began this evening, and worsened after eating a fried pork chop and macaroni and cheese at 11:00 p.m..  Denies any similar pain in the past.  She is nauseated, but has not vomited.  She did not take anything for it.  She still has a gallbladder.    The history is provided by the patient and the spouse.     Review of patient's allergies indicates:  No Known Allergies  Past Medical History:   Diagnosis Date    Hypertension      Past Surgical History:   Procedure Laterality Date    BREAST FIBROADENOMA SURGERY Left      SECTION      ,     DILATION AND CURETTAGE OF UTERUS      , 2009    ENDOMETRIAL ABLATION  2010    INCISIONAL BREAST BIOPSY Left 10/06/2022    per Dr. Devorah Tao - +Benign Tissue w/ Ductal Hyperplasia, Fibrosis and dilated ducts    KNEE CARTILAGE SURGERY Right     placement of breast device Left     TONSILLECTOMY AND ADENOIDECTOMY      TUBAL LIGATION  2010     Family History   Problem Relation Age of Onset    Breast cancer Maternal Grandmother 86    Hypertension Father     Diabetes type II Father     Uterine cancer Mother 27    Skin cancer Mother     Hypertension Mother     Diabetes type II Mother     Colon cancer Neg Hx     Ovarian cancer Neg Hx     Cervical cancer Neg Hx      Social History     Tobacco Use    Smoking status: Never    Smokeless tobacco: Never   Substance Use Topics    Alcohol use: Yes    Drug use: Never     Review of Systems   Constitutional:  Negative for fever.   HENT:  Negative for sore throat.    Respiratory:  Negative for shortness of breath.    Cardiovascular:  Negative for chest pain.   Gastrointestinal:  Positive for  abdominal pain and nausea. Negative for vomiting.   Genitourinary:  Negative for dysuria.   Musculoskeletal:  Positive for back pain.   Skin:  Negative for rash.   Neurological:  Negative for weakness.   Hematological:  Does not bruise/bleed easily.   All other systems reviewed and are negative.      Physical Exam     Initial Vitals [02/25/24 0139]   BP Pulse Resp Temp SpO2   (!) 156/105 89 18 97.9 °F (36.6 °C) 99 %      MAP       --         Physical Exam    Nursing note and vitals reviewed.  Constitutional: Vital signs are normal. She appears well-developed and well-nourished. She is cooperative.   Elevated BMI, no apparent distress   HENT:   Head: Normocephalic and atraumatic.   Mouth/Throat: Oropharynx is clear and moist.   Eyes: Conjunctivae, EOM and lids are normal. Pupils are equal, round, and reactive to light.   Neck: Trachea normal. Neck supple.   Normal range of motion.  Cardiovascular:  Normal rate, regular rhythm, normal heart sounds and intact distal pulses.           Pulmonary/Chest: Breath sounds normal.   Abdominal: Abdomen is soft. Bowel sounds are normal. She exhibits no distension. There is abdominal tenderness.   Mild epigastric tenderness There is no rebound and no guarding.   Musculoskeletal:         General: Normal range of motion.      Cervical back: Normal, normal range of motion and neck supple.      Lumbar back: Normal.     Neurological: She is alert and oriented to person, place, and time. She has normal strength. Coordination normal. GCS score is 15. GCS eye subscore is 4. GCS verbal subscore is 5. GCS motor subscore is 6.   Skin: Skin is warm, dry and intact. Capillary refill takes less than 2 seconds.   Psychiatric: She has a normal mood and affect. Her speech is normal and behavior is normal. Judgment and thought content normal. Cognition and memory are normal.         ED Course   Procedures  Labs Reviewed   COMPREHENSIVE METABOLIC PANEL - Abnormal; Notable for the following  components:       Result Value    Potassium Level 3.4 (*)     All other components within normal limits   URINALYSIS, REFLEX TO URINE CULTURE - Abnormal; Notable for the following components:    Appearance, UA SL CLOUDY (*)     Protein,  (*)     Blood, UA Large (*)     Nitrites, UA Positive (*)     Leukocyte Esterase, UA Small (*)     All other components within normal limits    Narrative:      URINE STABILITY IS 2 HOURS AT ROOM TEMP OR    SIX HOURS REFRIGERATED. PERFORMING TESTING ON    SPECIMENS GREATER THAN THIS AGE MAY AFFECT THE    FOLLOWING TESTS:    PH          SPECIFIC GRAVITY           BLOOD    CLARITY     BILIRUBIN               UROBILINOGEN   CBC WITH DIFFERENTIAL - Abnormal; Notable for the following components:    WBC 15.47 (*)     Neut % 77.3 (*)     Lymph % 15.3 (*)     Neut # 11.97 (*)     Mono # 0.86 (*)     IG# 0.04 (*)     All other components within normal limits   URINALYSIS, MICROSCOPIC - Abnormal; Notable for the following components:    Bacteria, UA Many (*)     WBC, UA >100 (*)     Squamous Epithelial Cells, UA Few (*)     All other components within normal limits   LIPASE - Normal   HCG QUALITATIVE URINE - Normal   CULTURE, URINE   CBC W/ AUTO DIFFERENTIAL    Narrative:     The following orders were created for panel order CBC W/ AUTO DIFFERENTIAL.  Procedure                               Abnormality         Status                     ---------                               -----------         ------                     CBC with Differential[[id number]]       Abnormal            Final result                 Please view results for these tests on the individual orders.        ECG Results              EKG 12-lead (Preliminary result)  Result time 02/25/24 01:55:06      Wet Read by Carmelo Umana MD (02/25/24 01:55:06, [facility])    Normal sinus rhythm, heart rate 85, normal intervals, normal axis, normal P waves, normal QRS, normal ST  segments, normal T-waves, normal QT.  This is a normal EKG.                                  Imaging Results              CT Abdomen Pelvis With IV Contrast NO Oral Contrast (Preliminary result)  Result time 02/25/24 02:51:24      Preliminary result by Heri Reese MD (02/25/24 02:51:24)                   Narrative:    START OF REPORT:  Technique: CT of the abdomen and pelvis was performed with axial images as well as sagittal and coronal reconstruction images with intravenous contrast.    Comparison: None available.    Clinical History: TODAY - ABD PAIN RADIATING TO BACK W/ CONSTANT NAUSEA.    Dosage Information: Automated Exposure Control was utilized.    Findings:  Lines and Tubes: None.  Thorax:  Lungs: No focal infiltrate or consolidation is seen.  Pleura: No effusions or thickening.  Heart: The heart size is within normal limits.  Abdomen:  Abdominal Wall: No abdominal wall pathology is seen.  Liver: The liver appears unremarkable.  Biliary System: No intrahepatic or extrahepatic biliary duct dilatation is seen.  Gallbladder: This may reflect focal gallbladder adenomyomatosis.  Pancreas: The pancreas appears unremarkable.  Spleen: The spleen appears unremarkable.  Adrenals: The adrenal glands appear unremarkable.  Kidneys: The kidneys appear unremarkable with no stones cysts masses or hydronephrosis.  Aorta: The abdominal aorta appears unremarkable.  IVC: Unremarkable.  Bowel:  Esophagus: The visualized esophagus appears unremarkable.  Stomach: The stomach appears unremarkable.  Duodenum: Unremarkable appearing duodenum.  Small Bowel: The small bowel appears unremarkable.  Colon: Nondistended.  Appendix: The appendix appears unremarkable seen on Image 79, Series 3 through Image 72, Series 3.  Peritoneum: No intraperitoneal free air or ascites is seen.    Pelvis:  Bladder: The bladder is nondistended however the bladder wall appears thickened after considering state of nondistension which could reflect an  element of cystitis.  Female:  Uterus: The uterus appears unremarkable.  Ovaries: The ovaries appear unremarkable with probable bilateral physiologic cysts.    Bony structures:  Dorsal Spine: The visualized dorsal spine appears unremarkable.  Bony Pelvis: Sclerotic changes are seen along the articulating surface of the bilateral natali along the sacroiliac joints reflective of osteitis condensans ilii.      Impression:  1. The bladder is nondistended however the bladder wall appears thickened after considering state of nondistension which could reflect an element of cystitis. Correlate with clinical and laboratory findings.  2. No acute intraabdominal or pelvic solid organ or bowel pathology identified. Details and findings as discussed.                                         Medications   ketorolac injection 30 mg (30 mg Intravenous Given 2/25/24 0200)   HYDROmorphone injection 0.5 mg (0.5 mg Intravenous Given 2/25/24 0158)   LORazepam injection 0.5 mg (0.5 mg Intravenous Given 2/25/24 0158)   prochlorperazine injection Soln 5 mg (5 mg Intravenous Given 2/25/24 0159)   iohexoL (OMNIPAQUE 350) injection 100 mL (100 mLs Intravenous Given 2/25/24 0231)   piperacillin-tazobactam (ZOSYN) 4.5 g in dextrose 5 % in water (D5W) 100 mL IVPB (MB+) (0 g Intravenous Stopped 2/25/24 0258)     Medical Decision Making  Epigastric pain, radiating to back, worsened with fatty food  Differential diagnosis:  Cholecystitis, pancreatitis, gastritis, AAA/aortic dissection  Analgesia, antiemetics  Labs, CT    Amount and/or Complexity of Data Reviewed  Labs: ordered. Decision-making details documented in ED Course.  Radiology: ordered. Decision-making details documented in ED Course.    Risk  Prescription drug management.               ED Course as of 02/25/24 0259   Sun Feb 25, 2024   0206 CBC W/ AUTO DIFFERENTIAL(!)  Significant leukocytosis [TM]   0207 Urinalysis, Reflex to Urine Culture(!)  Dipstick looks like a UTI [TM]   0220 Comp.  Metabolic Panel(!)  Normal LFTs [TM]   0220 Urinalysis, Microscopic(!)  Pretty significant UTI [TM]   0254 CT Abdomen Pelvis With IV Contrast NO Oral Contrast  Bladder wall indicate cystitis, no other pathology found. [TM]      ED Course User Index  [TM] Carmelo Umana MD                           Clinical Impression:  Final diagnoses:  [R10.13] Epigastric pain  [N30.00] Acute cystitis without hematuria (Primary)          ED Disposition Condition    Discharge Good          ED Prescriptions       Medication Sig Dispense Start Date End Date Auth. Provider    nitrofurantoin, macrocrystal-monohydrate, (MACROBID) 100 MG capsule Take 1 capsule (100 mg total) by mouth 2 (two) times daily. for 5 days 10 capsule 2/25/2024 3/1/2024 Carmelo Umana MD    pantoprazole (PROTONIX) 40 MG tablet Take 1 tablet (40 mg total) by mouth once daily. 30 tablet 2/25/2024 3/26/2024 Carmelo Umana MD          Follow-up Information       Follow up With Specialties Details Why Contact Info    Sanju Salmeron MD Pediatrics Call in 1 day  [address] [place] [id number]  [phone]               Carmelo Umana MD  02/25/24 4719

## 2024-02-27 LAB — BACTERIA UR CULT: ABNORMAL

## 2024-02-29 LAB
OHS QRS DURATION: 80 MS
OHS QTC CALCULATION: 428 MS

## 2024-03-20 NOTE — PROGRESS NOTES
Chief Complaint: Annual exam    Chief Complaint   Patient presents with    Well Woman       HPI:   38 y.o. F  presents for an annual gyn exam. S/p tubal ligation and endometrial ablation. Reports recent CT of abdomen and pelvis due to epigastric pain and nausea/vomiting 2024. Reports was treated in ER during that time for a UTI and was treated c IV abx. C/o breast tenderness, states more tender in L breast than R breast.  Hx FBD  Denies pelvic pain or pain with intercourse.  No bleeding with ablation    2024 CT of abdomen and pelvis results to follow:  FINDINGS:  Liver normal enhancement with no focal lesion.  Gallbladder and biliary ductal system normal   Pancreas, stomach, spleen, adrenal glands normal.   Kidneys normal enhancement with no hydronephrosis.  Aorta tapers normally.   Small arch bowel loops are normal with no thickening or dilation.  Appendix normal.  Mesentery normal with no inflammatory change or ascites.  No lymphadenopathy.   Possible right ovarian cyst 3.5 cm.  Uterus, adnexa, bladder, rectum otherwise normal.   Bones intact.  Lung bases clear.    Impression:  Right adnexal cyst 3.5 cm.  No other abnormality seen.  Agree with nighthawk.    Last pap: 03/10/2023 NIL -HPV  MM2024     FmHx: MGM c breast cancer. Mother c uterine cancer. Denies ovarian and colon cancers.     Labs / Significant Studies:  LMP: S/p ablation  Frequency: N/A  Cycle length: N/A  Flow: N/A  Intermenstrual bleeding: No  Postcoital bleeding: No  Dysmenorrhea: N/A  Sexually active: Yes  Dyspareunia: No  Contraception: S/p tubal ligation and ablation  H/o STI: No   Last pap: 03/10/2023 NIL -HPV  H/o abnl pap: No  Colposcopy: No  Gardasil: 0/3  MM2023 benign; 3/25/24   H/o abnl MMG: Yes  Colonoscopy: Never    Family History   Problem Relation Age of Onset    Breast cancer Maternal Grandmother 86    Hypertension Father     Diabetes type II Father     Uterine cancer Mother 27    Skin cancer  Mother     Hypertension Mother     Diabetes type II Mother     Colon cancer Neg Hx     Ovarian cancer Neg Hx     Cervical cancer Neg Hx          Past Medical History:   Diagnosis Date    Hypertension      Past Surgical History:   Procedure Laterality Date    BREAST FIBROADENOMA SURGERY Left      SECTION      ,     DILATION AND CURETTAGE OF UTERUS      ,     ENDOMETRIAL ABLATION  2010    INCISIONAL BREAST BIOPSY Left 10/06/2022    per Dr. Devorah Tao - +Benign Tissue w/ Ductal Hyperplasia, Fibrosis and dilated ducts    KNEE CARTILAGE SURGERY Right     placement of breast device Left     TONSILLECTOMY AND ADENOIDECTOMY      TUBAL LIGATION  2010       Current Outpatient Medications:     phentermine (ADIPEX-P) 37.5 mg tablet, Take 37.5 mg by mouth before breakfast., Disp: , Rfl:     valsartan (DIOVAN) 80 MG tablet, Take 80 mg by mouth., Disp: , Rfl:     pantoprazole (PROTONIX) 40 MG tablet, Take 1 tablet (40 mg total) by mouth once daily. (Patient not taking: Reported on 4/3/2024), Disp: 30 tablet, Rfl: 0    topiramate (TOPAMAX) 25 MG tablet, Take 25 mg by mouth 2 (two) times daily., Disp: , Rfl:     Review of patient's allergies indicates:  No Known Allergies    Social History     Tobacco Use    Smoking status: Never    Smokeless tobacco: Never   Substance Use Topics    Alcohol use: Not Currently     Comment: Occasionally    Drug use: Never       Review of Systems:  General/Constitutional: Chills denies. Fatigue/weakness denies. Fever denies. Night sweats denies. Hot flashes denies    Respiratory: Cough denies. Hemoptysis denies. SOB denies. Sputum production denies. Wheezing denies .   Cardiovascular: Chest pain denies . Dizziness denies. Palpitations denies. Swelling in hands/feet denies    Gastrointestinal: Abdominal pain denies. Blood in stool denies. Constipation denies. Diarrhea denies. Heartburn denies. Nausea denies. Vomiting denies    Genitourinary: Incontinence denies. Blood in urine  "denies. Frequent urination denies. Painful urination denies. Urinary urgency denies. Nocturia denies    Gynecologic: Irregular menses denies. Heavy bleeding denies. Painful menses denies. Vaginal discharge denies. Vaginal odor denies. Vaginal itching denies. Vaginal lesion denies. Pelvic pain denies. Decreased libido denies. Vulvar lesion denies. Prolapse of genital organs denies. Painful intercourse denies. Postcoital bleeding denies    Psychiatric: Depression denies. Anxiety denies     Physical Exam:   Vitals:    04/03/24 1531   BP: 116/64   Temp: 98.2 °F (36.8 °C)   Weight: 88.8 kg (195 lb 12.3 oz)   Height: 5' 4" (1.626 m)       Body mass index is 33.6 kg/m².       Chaperone: present.     General appearance: healthy, well-nourished and well-developed     Psychiatric: Orientation to time, place and person. Normal mood and affect and active, alert     Skin: Appearance: no rashes or lesions.     Neck:   Neck: supple, FROM, trachea midline. and no masses   Thyroid: no enlargement or nodules and non-tender.       Cardiovascular:   Auscultation: RRR and no murmur.   Peripheral Vascular: no varicosities, LLE edema, RLE edema, calf tenderness, and palpable cord and pedal pulses intact.     Lungs:   Respiratory effort: no intercostal retractions or accessory muscle usage.   Auscultation: no wheezing, rales/crackles, or rhonchi and clear to auscultation.     Breast:   Inspection/Palpation: no tenderness, discrete/distinct masses, skin changes, or abnormal secretions. Nipple appearance normal. Bilateral +FBC    Abdomen:   Auscultation/Inspection/Palpation: no hepatomegaly, splenomegaly, masses, tenderness or CVA tenderness and soft, non-distended bowel sounds preset.    Hernia: no palpable hernias.     Female Genitalia:    Vulva: no masses, tenderness or lesions    Bladder/Urethra: no urethral discharge or mass, normal meatus, bladder non-distended.    Vagina: no tenderness, erythema, cystocele, rectocele, abnormal vaginal " discharge or vesicle(s) or ulcers    Cervix: no discharge, no cervical lacerations noted or motion tenderness and grossly normal    Uterus: normal size and shape and midline, non-tender, and no uterine prolapse.    Adnexa/Parametria: no parametrial tenderness or mass, no adnexal tenderness or ovarian mass.     Lymph Nodes:   Palpation: non tender submandibular nodes, axillary nodes, or inguinal nodes.     Rectal Exam:   Rectum: normal perianal skin.       Assessment:     Patient Active Problem List   Diagnosis    Acute cystitis without hematuria    Epigastric pain       Health Maintenance Due   Topic Date Due    Hepatitis C Screening  Never done    HIV Screening  Never done    TETANUS VACCINE  Never done    Influenza Vaccine (1) Never done    COVID-19 Vaccine (4 - 2023-24 season) 09/01/2023     Health Maintenance Topics with due status: Not Due       Topic Last Completion Date    Cervical Cancer Screening 03/10/2023    Hemoglobin A1c (Diabetic Prevention Screening) 07/28/2023         Plan:    Luz was seen today for well woman.    Diagnoses and all orders for this visit:    Abnormal gynecological examination  PAP  Counseled regarding safe sex practices and prevention of STD's .  Discussed contraception options.  Advised avoidance of tobacco, alcohol, and drugs.  Discussed breast self-awareness  Seat belt  Multivitamin, Ca/Vit D  Healthy diet and exercise  RTC 1 yr    Bilateral fibrocystic breast changes  - Advised pt to decrease caffeine intake (e.g. soda, coffee, tea, chocolate, etc.)  - Advised pt to use Aspercreme PRN.     Mastodynia of left breast  Discussed with patient treatment options including compression/sports bra, vitamin E, decrease caffeine intake, NSAIDs, and primrose oil.        Explained that screening is not 100% reliable.  Advised patient if she notices any changes to her breast including a lump, mass, dimpling, discharge, rash, or tenderness she should contact us immediately.     Given [facility]  fibrocystic breast handout    Family history of uterine cancer    Family history of breast cancer  - Discussed recommendations of annual screening after age of 40 with mammogram and MRI for patients with lifetime risk greater than 25%     - Explained that screening is not 100% reliable.  Advised patient if she notices any changes to her breast including a lump, mass, dimpling, discharge, rash, or tenderness she should contact us immediately.     - Recommend monthly BSE     This note was transcribed by Jacqui Pizarro MA. There may be transcription errors as a result, however minimal. Effort has been made to ensure accuracy of transcription, but any obvious errors or omissions should be clarified with the author of the document.

## 2024-03-25 ENCOUNTER — HOSPITAL ENCOUNTER (OUTPATIENT)
Dept: RADIOLOGY | Facility: HOSPITAL | Age: 39
Discharge: HOME | End: 2024-03-25
Attending: NURSE PRACTITIONER
Payer: MEDICAID

## 2024-03-25 DIAGNOSIS — N64.4 MASTODYNIA OF LEFT BREAST: ICD-10-CM

## 2024-03-25 DIAGNOSIS — N60.11 BILATERAL FIBROCYSTIC BREAST DISEASE (FCBD): ICD-10-CM

## 2024-03-25 DIAGNOSIS — N60.12 BILATERAL FIBROCYSTIC BREAST DISEASE (FCBD): ICD-10-CM

## 2024-03-25 DIAGNOSIS — Z80.3 FAMILY HISTORY OF BREAST CANCER: ICD-10-CM

## 2024-03-25 PROCEDURE — 77066 DX MAMMO INCL CAD BI: CPT | Mod: 26,,, | Performed by: STUDENT IN AN ORGANIZED HEALTH CARE EDUCATION/TRAINING PROGRAM

## 2024-03-25 PROCEDURE — 77066 DX MAMMO INCL CAD BI: CPT | Mod: TC

## 2024-03-25 PROCEDURE — 77062 BREAST TOMOSYNTHESIS BI: CPT | Mod: 26,,, | Performed by: STUDENT IN AN ORGANIZED HEALTH CARE EDUCATION/TRAINING PROGRAM

## 2024-03-25 PROCEDURE — 76642 ULTRASOUND BREAST LIMITED: CPT | Mod: TC,50

## 2024-03-25 PROCEDURE — 76642 ULTRASOUND BREAST LIMITED: CPT | Mod: 26,50,, | Performed by: STUDENT IN AN ORGANIZED HEALTH CARE EDUCATION/TRAINING PROGRAM

## 2024-04-03 ENCOUNTER — OFFICE VISIT (OUTPATIENT)
Dept: OBSTETRICS AND GYNECOLOGY | Facility: CLINIC | Age: 39
End: 2024-04-03
Payer: MEDICAID

## 2024-04-03 VITALS
SYSTOLIC BLOOD PRESSURE: 116 MMHG | DIASTOLIC BLOOD PRESSURE: 64 MMHG | WEIGHT: 195.75 LBS | TEMPERATURE: 98 F | BODY MASS INDEX: 33.42 KG/M2 | HEIGHT: 64 IN

## 2024-04-03 DIAGNOSIS — N60.11 BILATERAL FIBROCYSTIC BREAST CHANGES: ICD-10-CM

## 2024-04-03 DIAGNOSIS — N64.4 MASTODYNIA OF LEFT BREAST: ICD-10-CM

## 2024-04-03 DIAGNOSIS — Z01.411 ABNORMAL GYNECOLOGICAL EXAMINATION: Primary | ICD-10-CM

## 2024-04-03 DIAGNOSIS — N60.12 BILATERAL FIBROCYSTIC BREAST CHANGES: ICD-10-CM

## 2024-04-03 DIAGNOSIS — Z80.3 FAMILY HISTORY OF BREAST CANCER: ICD-10-CM

## 2024-04-03 DIAGNOSIS — Z80.49 FAMILY HISTORY OF UTERINE CANCER: ICD-10-CM

## 2024-04-03 PROCEDURE — 3074F SYST BP LT 130 MM HG: CPT | Mod: CPTII,,, | Performed by: NURSE PRACTITIONER

## 2024-04-03 PROCEDURE — 99395 PREV VISIT EST AGE 18-39: CPT | Mod: ,,, | Performed by: NURSE PRACTITIONER

## 2024-04-03 PROCEDURE — 1160F RVW MEDS BY RX/DR IN RCRD: CPT | Mod: CPTII,,, | Performed by: NURSE PRACTITIONER

## 2024-04-03 PROCEDURE — 87624 HPV HI-RISK TYP POOLED RSLT: CPT | Performed by: NURSE PRACTITIONER

## 2024-04-03 PROCEDURE — 3078F DIAST BP <80 MM HG: CPT | Mod: CPTII,,, | Performed by: NURSE PRACTITIONER

## 2024-04-03 PROCEDURE — 3008F BODY MASS INDEX DOCD: CPT | Mod: CPTII,,, | Performed by: NURSE PRACTITIONER

## 2024-04-03 PROCEDURE — 4010F ACE/ARB THERAPY RXD/TAKEN: CPT | Mod: CPTII,,, | Performed by: NURSE PRACTITIONER

## 2024-04-03 PROCEDURE — 1159F MED LIST DOCD IN RCRD: CPT | Mod: CPTII,,, | Performed by: NURSE PRACTITIONER

## 2024-04-08 LAB
HIGH RISK HPV: NEGATIVE
PSYCHE PATHOLOGY RESULT: NORMAL

## 2024-04-08 NOTE — PROGRESS NOTES
Hey,  Your pap smear negative for intraepithelial lesion or malignancy, which means it is normal and negative for cancer.  The HPV testing that we do on pap smears is also negative.  We will  repeat your pap smear in 1 year.  Call the office if you have any problems.  Enjoy your day!    Marilee

## 2024-05-03 ENCOUNTER — OFFICE VISIT (OUTPATIENT)
Dept: OBSTETRICS AND GYNECOLOGY | Facility: CLINIC | Age: 39
End: 2024-05-03
Payer: MEDICAID

## 2024-05-03 VITALS
DIASTOLIC BLOOD PRESSURE: 66 MMHG | BODY MASS INDEX: 31.65 KG/M2 | TEMPERATURE: 97 F | HEIGHT: 64 IN | WEIGHT: 185.38 LBS | SYSTOLIC BLOOD PRESSURE: 130 MMHG

## 2024-05-03 DIAGNOSIS — R31.9 HEMATURIA, UNSPECIFIED TYPE: Primary | ICD-10-CM

## 2024-05-03 LAB
BILIRUB UR QL STRIP: POSITIVE
GLUCOSE UR QL STRIP: NEGATIVE
KETONES UR QL STRIP: POSITIVE
LEUKOCYTE ESTERASE UR QL STRIP: NEGATIVE
PH, POC UA: 5.5
POC BLOOD, URINE: POSITIVE
POC NITRATES, URINE: NEGATIVE
PROT UR QL STRIP: POSITIVE
SP GR UR STRIP: 1.03 (ref 1–1.03)
UROBILINOGEN UR STRIP-ACNC: 1 (ref 0.1–1.1)

## 2024-05-03 PROCEDURE — 3078F DIAST BP <80 MM HG: CPT | Mod: CPTII,,, | Performed by: NURSE PRACTITIONER

## 2024-05-03 PROCEDURE — 99213 OFFICE O/P EST LOW 20 MIN: CPT | Mod: ,,, | Performed by: NURSE PRACTITIONER

## 2024-05-03 PROCEDURE — 1160F RVW MEDS BY RX/DR IN RCRD: CPT | Mod: CPTII,,, | Performed by: NURSE PRACTITIONER

## 2024-05-03 PROCEDURE — 3075F SYST BP GE 130 - 139MM HG: CPT | Mod: CPTII,,, | Performed by: NURSE PRACTITIONER

## 2024-05-03 PROCEDURE — 81003 URINALYSIS AUTO W/O SCOPE: CPT | Mod: QW,,, | Performed by: NURSE PRACTITIONER

## 2024-05-03 PROCEDURE — 3008F BODY MASS INDEX DOCD: CPT | Mod: CPTII,,, | Performed by: NURSE PRACTITIONER

## 2024-05-03 PROCEDURE — 1159F MED LIST DOCD IN RCRD: CPT | Mod: CPTII,,, | Performed by: NURSE PRACTITIONER

## 2024-05-03 PROCEDURE — 4010F ACE/ARB THERAPY RXD/TAKEN: CPT | Mod: CPTII,,, | Performed by: NURSE PRACTITIONER

## 2024-05-03 PROCEDURE — 87086 URINE CULTURE/COLONY COUNT: CPT | Performed by: NURSE PRACTITIONER

## 2024-05-03 NOTE — PROGRESS NOTES
Chief Complaint:  irregular bleeding    History of Present Illness:  Patient is a 38 y.o.  s/p tubal and ablation presents c/o a five day history of bleeding following urination. States the bleeding started Monday at it's heaviest, not enough to wear a pad but enough to notice in toilet. Bleeding titrated down Tuesday, no bleeding Wednesday. Did note blood yesterday, no bleeding today. Denies dysuria or bladder complaints.  Denies bleeding from vagina, thinks bleeding is from bladder      LMP: S/p ablation  Frequency: sporadic spotting  Cycle length: n/a  Flow: n/a  Intermenstrual bleeding: No  Postcoital bleeding: No  Dysmenorrhea: no  Sexually active: yes  Dyspareunia: No  Contraception: BTL/ablation  H/o STI: No  Last pap: 4/3/24 NIL neg HPV  H/o abnl pap: No  Colposcopy: No  Gardasil: 0/3  MMG: 3/16/23 benign; 3/25/24 Dx MMG & Left Breast U/S Benign   H/o abnl MMG: Yes  Colonoscopy: n/a      Review of systems:  General/Constitutional: Chills denies. Fatigue/weakness denies. Fever denies. Night sweats denies. Hot flashes denies  Breast: Dimpling denies. Breast mass denies. Breast pain/tenderness denies. Nipple discharge denies. Puckering denies.  Gastrointestinal: Abdominal pain denies. Blood in stool denies. Constipation denies. Diarrhea denies. Heartburn denies. Nausea denies. Vomiting denies   Genitourinary: Incontinence denies. Blood in urine ADMITS. Frequent urination denies. Urgency denies. Painful urination denies. Nocturia denies   Gynecologic: Irregular menses denies. Heavy bleeding denies. Painful menses denies. Vaginal discharge denies. Vaginal odor denies. Vaginal itching/Irritation denies. Vaginal lesion denies.  Pelvic pain denies. Decreased libido denies. Vulvar lesion denies. Prolapse of genital organs denies. Painful intercourse denies. Postcoital bleeding denies   Psychiatric: Mood lability denies. Depressed mood denies. Suicidal thoughts denies. Anxiety denies. Overwhelmed denies.  "Appetite normal. Energy level normal.     OB History    Para Term  AB Living   4 2 2 0 2 2   SAB IAB Ectopic Multiple Live Births   1 0 0 0 2      # 1 - Date: 03, Sex: Female, Weight: 3.374 kg (7 lb 7 oz), GA: 41w0d, Type: , Low Transverse, Apgar1: None, Apgar5: None, Living: Living, Birth Comments: None    # 2 - Date: 05, Sex: None, Weight: None, GA: 9w0d, Type: Spontaneous , Apgar1: None, Apgar5: None, Living:  Demise, Birth Comments: None    # 3 - Date: 06, Sex: Male, Weight: 3.175 kg (7 lb), GA: 38w0d, Type: , Low Transverse, Apgar1: None, Apgar5: None, Living: Living, Birth Comments: None    # 4 - Date: 09, Sex: None, Weight: None, GA: 6w0d, Type: Spontaneous , Apgar1: None, Apgar5: None, Living:  Demise, Birth Comments: None      Past Medical History:   Diagnosis Date    Hypertension        Current Outpatient Medications:     phentermine (ADIPEX-P) 37.5 mg tablet, Take 37.5 mg by mouth before breakfast., Disp: , Rfl:     valsartan (DIOVAN) 80 MG tablet, Take 80 mg by mouth., Disp: , Rfl:       Physical Exam:  /66 (BP Location: Left arm, Patient Position: Sitting, BP Method: Medium (Manual))   Temp 96.8 °F (36 °C) (Temporal)   Ht 5' 4" (1.626 m)   Wt 84.1 kg (185 lb 6.4 oz)   LMP  (Exact Date)   BMI 31.82 kg/m²     Chaperone present.       Constitutional: General appearance: healthy, well-nourished and well-developed  Psychiatric:  Orientation to time, place and person. Normal mood and affect and active, alert   Abdomen: Auscultation/Inspection/Palpation: No tenderness or masses. Soft, nondistended      Female Genitalia:      Vulva: no masses, atrophy or lesions      Bladder/Urethra: no urethral discharge or mass, normal meatus, bladder non-distended.      Vagina: no tenderness, erythema, cystocele, rectocele, abnormal vaginal discharge, or vesicle(s) or ulcers, no blood noted     Cervix: no discharge or cervical " motion tenderness and grossly normal     Uterus: normal size and shape and midline, non-tender, and no uterine prolapse.     Adnexa/Parametria: no parametrial tenderness or mass, no adnexal tenderness or ovarian mass.       Assessment/Plan:  1. Hematuria  Normal exam today  Educated  UA  +blood, +ketones  Urine culture  Cultures: myco/urea         This note was transcribed by Misty Alcantar MA. There may be transcription errors as a result, however minimal. Effort has been made to ensure accuracy of transcription, but any obvious errors or omissions should be clarified with the author of the document.

## 2024-05-06 LAB — BACTERIA UR CULT: NO GROWTH

## 2024-05-30 ENCOUNTER — OFFICE VISIT (OUTPATIENT)
Dept: OBSTETRICS AND GYNECOLOGY | Facility: CLINIC | Age: 39
End: 2024-05-30
Payer: MEDICAID

## 2024-05-30 ENCOUNTER — TELEPHONE (OUTPATIENT)
Dept: OBSTETRICS AND GYNECOLOGY | Facility: CLINIC | Age: 39
End: 2024-05-30

## 2024-05-30 VITALS
SYSTOLIC BLOOD PRESSURE: 128 MMHG | TEMPERATURE: 98 F | BODY MASS INDEX: 30.5 KG/M2 | DIASTOLIC BLOOD PRESSURE: 68 MMHG | WEIGHT: 178.63 LBS | HEIGHT: 64 IN

## 2024-05-30 DIAGNOSIS — N73.9 PELVIC INFLAMMATORY DISEASE DUE TO MYCOPLASMA HOMINIS: ICD-10-CM

## 2024-05-30 DIAGNOSIS — B96.89 PELVIC INFLAMMATORY DISEASE DUE TO MYCOPLASMA HOMINIS: ICD-10-CM

## 2024-05-30 DIAGNOSIS — N73.9 PELVIC INFLAMMATORY DISEASE DUE TO MYCOPLASMA HOMINIS: Primary | ICD-10-CM

## 2024-05-30 DIAGNOSIS — N93.9 ABNORMAL UTERINE BLEEDING: Primary | ICD-10-CM

## 2024-05-30 DIAGNOSIS — Z98.890 S/P ENDOMETRIAL ABLATION: ICD-10-CM

## 2024-05-30 DIAGNOSIS — B96.89 PELVIC INFLAMMATORY DISEASE DUE TO MYCOPLASMA HOMINIS: Primary | ICD-10-CM

## 2024-05-30 PROCEDURE — 3008F BODY MASS INDEX DOCD: CPT | Mod: CPTII,,, | Performed by: OBSTETRICS & GYNECOLOGY

## 2024-05-30 PROCEDURE — 4010F ACE/ARB THERAPY RXD/TAKEN: CPT | Mod: CPTII,,, | Performed by: OBSTETRICS & GYNECOLOGY

## 2024-05-30 PROCEDURE — 1159F MED LIST DOCD IN RCRD: CPT | Mod: CPTII,,, | Performed by: OBSTETRICS & GYNECOLOGY

## 2024-05-30 PROCEDURE — 3074F SYST BP LT 130 MM HG: CPT | Mod: CPTII,,, | Performed by: OBSTETRICS & GYNECOLOGY

## 2024-05-30 PROCEDURE — 99213 OFFICE O/P EST LOW 20 MIN: CPT | Mod: ,,, | Performed by: OBSTETRICS & GYNECOLOGY

## 2024-05-30 PROCEDURE — 3078F DIAST BP <80 MM HG: CPT | Mod: CPTII,,, | Performed by: OBSTETRICS & GYNECOLOGY

## 2024-05-30 RX ORDER — DOXYCYCLINE 100 MG/1
100 CAPSULE ORAL 2 TIMES DAILY
Qty: 14 CAPSULE | Refills: 0 | Status: SHIPPED | OUTPATIENT
Start: 2024-05-30 | End: 2024-06-06

## 2024-05-30 RX ORDER — MOXIFLOXACIN HYDROCHLORIDE 400 MG/1
400 TABLET ORAL DAILY
Qty: 7 TABLET | Refills: 0 | Status: SHIPPED | OUTPATIENT
Start: 2024-05-30 | End: 2024-06-06

## 2024-05-30 NOTE — TELEPHONE ENCOUNTER
Patients Moxi was denied. New RX for an additional 7 days of Doxy sent in. Pt verbalized understanding.

## 2024-05-30 NOTE — PROGRESS NOTES
Chief Complaint     Multidisciplinary Discussion (Discuss hysterectomy)    HPI:     Patient is a 38 y.o.  presents today to discuss hysterectomy. S/p tubal ligation, endometrial ablation. Reports light bothersome bleeding February and again in March followed by heavy menses in March, and April. LMP 24. Denies pain unrelated to bleeding, pain/bleeding with intercourse, abnormal discharge, odor. 5/3/24: +Mycoplasma Hominis.     Gyn History:    Menstrual History   Cycle: Yes  Menarche Age: 15 years  Flow Duration: 2  Flow: Light  Interval:  (irregular)  Intermenstrual Bleeding: Yes  Dysmenorrhea: Yes  Dysmenorrhea Severity : Mild  [place]  Sexually Active: Yes  Sexual Orientation: heterosexual  Postcoital Bleeding: No  Dyspareunia: No  STI History: No  Contraception: No  Menopause  Menopause Age: 1 years  Breast History  Last Breast Imaging Date: Yes  Date: 24  History of Abnormal Breast Imaging : (!) Yes  Date: 24 (benign)  History of Breast Biopsy: Yes  Date of last biopsy: 10/06/22 (benign)  Results of Last Biopsy: No  Pap History   Last pap date: 24  Result: Normal  History of Abnormal Pap: No  HPV Vaccine Completed: No      Per SL previous note 5/3/24:  History of Present Illness:  Patient is a 38 y.o.  s/p tubal and ablation presents c/o a five day history of bleeding following urination. States the bleeding started Monday at it's heaviest, not enough to wear a pad but enough to notice in toilet. Bleeding titrated down Tuesday, no bleeding Wednesday. Did note blood yesterday, no bleeding today. Denies dysuria or bladder complaints.  Denies bleeding from vagina, thinks bleeding is from bladder       2024 CT of abdomen and pelvis results to follow:  FINDINGS:  Liver normal enhancement with no focal lesion.  Gallbladder and biliary ductal system normal   Pancreas, stomach, spleen, adrenal glands normal.   Kidneys normal enhancement with no hydronephrosis.  Aorta  tapers normally.   Small arch bowel loops are normal with no thickening or dilation.  Appendix normal.  Mesentery normal with no inflammatory change or ascites.  No lymphadenopathy.   Possible right ovarian cyst 3.5 cm.  Uterus, adnexa, bladder, rectum otherwise normal.   Bones intact.  Lung bases clear.  Impression:  Right adnexal cyst 3.5 cm.  No other abnormality seen.  Agree with nighthawk.  Last pap: 03/10/2023 NIL -HPV  MM2024   FmHx: MGM c breast cancer. Mother c uterine cancer. Denies ovarian and colon cancers.     Past Medical History:   Diagnosis Date    Hypertension        Past Surgical History:   Procedure Laterality Date    BREAST FIBROADENOMA SURGERY Left      SECTION      ,     DILATION AND CURETTAGE OF UTERUS      ,     ENDOMETRIAL ABLATION  2010    INCISIONAL BREAST BIOPSY Left 10/06/2022    per Dr. Devorah Tao - +Benign Tissue w/ Ductal Hyperplasia, Fibrosis and dilated ducts    KNEE CARTILAGE SURGERY Right     placement of breast device Left     TONSILLECTOMY AND ADENOIDECTOMY      TUBAL LIGATION  2010       Family History   Problem Relation Name Age of Onset    Breast cancer Maternal Grandmother  86    Hypertension Father      Diabetes type II Father      Uterine cancer Mother  27    Skin cancer Mother      Hypertension Mother      Diabetes type II Mother      Colon cancer Neg Hx      Ovarian cancer Neg Hx      Cervical cancer Neg Hx         OB History          4    Para   2    Term   2            AB   2    Living   2         SAB   1    IAB        Ectopic        Multiple        Live Births   2                 Current Outpatient Medications on File Prior to Visit   Medication Sig Dispense Refill    phentermine (ADIPEX-P) 37.5 mg tablet Take 37.5 mg by mouth before breakfast.      valsartan (DIOVAN) 80 MG tablet Take 80 mg by mouth.       No current facility-administered medications on file prior to visit.       Review of Systems:       Review of  "Systems   Constitutional:  Negative for chills and fever.   Gastrointestinal:  Negative for abdominal pain, constipation and diarrhea.   Genitourinary:  Positive for menstrual problem and pelvic pain. Negative for bladder incontinence, decreased libido, dysmenorrhea, dyspareunia, dysuria, flank pain, frequency, genital sores, hematuria, hot flashes, menorrhagia, urgency, vaginal bleeding, vaginal discharge, vaginal pain, urinary incontinence, postcoital bleeding, postmenopausal bleeding, vaginal dryness and vaginal odor.        Physical Exam:    /68 (BP Location: Left arm, Patient Position: Sitting, BP Method: Medium (Manual))   Temp 97.7 °F (36.5 °C) (Temporal)   Ht 5' 4" (1.626 m)   Wt 81 kg (178 lb 9.6 oz)   LMP 05/26/2024 (Exact Date)   BMI 30.66 kg/m²     Physical Exam   General Exam:    General Appearance: alert, in no acute distress, normal, well nourished.  Psych:  Orientation: time, place, person.  Mood/Affect: Normal         Assessment:   1. Abnormal uterine bleeding    2. Pelvic inflammatory disease due to mycoplasma hominis  -     doxycycline (VIBRAMYCIN) 100 MG Cap; Take 1 capsule (100 mg total) by mouth 2 (two) times daily. for 7 days  Dispense: 14 capsule; Refill: 0  -     moxifloxacin (AVELOX) 400 mg tablet; Take 1 tablet (400 mg total) by mouth once daily. Begin after completing doxycycline for 7 days  Dispense: 7 tablet; Refill: 0    3. S/P endometrial ablation             Plan:   1. Pelvic inflammatory disease due to mycoplasma hominis  - doxycycline (VIBRAMYCIN) 100 MG Cap; Take 1 capsule (100 mg total) by mouth 2 (two) times daily. for 7 days  Dispense: 14 capsule; Refill: 0  - moxifloxacin (AVELOX) 400 mg tablet; Take 1 tablet (400 mg total) by mouth once daily. Begin after completing doxycycline for 7 days  Dispense: 7 tablet; Refill: 0    2. S/P endometrial ablation    3. Abnormal uterine bleeding    Discussed symptoms, labs, and imaging.  Discussed planned procedure and associated " risks in detail.  Discussed oophorectomy and associated risks including increased risk for cardiovascular disease, osteoporosis, menopausal symptoms (hot flashes, vaginal dryness, etc), and cognitive decline.    Reviewed oneswab, discussed +Mycoplasmas Hominis. Will treat per protocol. Discussed potential cause of pain, bleeding pattern.   If bleeding, pain continues following completion of treatment, will RTC for further hysterectomy discussion.     RTC PRN    This note was transcribed by Lucille Canales. There may be transcription errors as a result, however minimal. Effort has been made to ensure accuracy of transcription, but any obvious errors or omissions should be clarified with the author of the document.

## 2024-06-24 NOTE — PROGRESS NOTES
Chief Complaint     Vaginal Bleeding (Pt c/o post ablation bleeding )    HPI:     Patient is a 38 y.o.  presents today to discuss hysterectomy for definitive management of AUB. S/p endometrial ablation.   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     Gyn History:    Menstrual History   Cycle: Yes  Menarche Age: 15 years  Flow Duration: 4  Flow:  (brown spotting)  Interval:  (irregular)  Intermenstrual Bleeding: No  Dysmenorrhea: Yes  Dysmenorrhea Severity : (!) Severe  [place]  Sexually Active: Yes  Sexual Orientation: heterosexual  Postcoital Bleeding: No  Dyspareunia: No  STI History: No  Contraception: No  Menopause  Menopause Age: 1 years  Breast History  Last Breast Imaging Date: Yes  Date: 24 (benign (L) breast u/s benign)  History of Abnormal Breast Imaging : No  History of Breast Biopsy: No  Pap History   Last pap date: 24  Result: Normal (neg w/ neg HPV)  History of Abnormal Pap: No  HPV Vaccine Completed: No    Per previous note 24:  Patient is a 38 y.o.  presents today to discuss hysterectomy. S/p tubal ligation, endometrial ablation. Reports light bothersome bleeding February and again in March followed by heavy menses in March, and April. LMP 24. Denies pain unrelated to bleeding, pain/bleeding with intercourse, abnormal discharge, odor. 5/3/24: +Mycoplasma Hominis.      Per [facility] previous note 5/3/24:  History of Present Illness:  Patient is a 38 y.o.  s/p tubal and ablation presents c/o a five day history of bleeding following urination. States the bleeding started Monday at it's heaviest, not enough to wear a pad but enough to notice in toilet. Bleeding titrated down Tuesday, no bleeding Wednesday. Did note blood yesterday, no bleeding today. Denies dysuria or bladder complaints.  Denies bleeding from vagina, thinks bleeding is from bladder        2024 CT of abdomen and pelvis results to follow:  FINDINGS:  Liver normal enhancement with no focal  lesion.  Gallbladder and biliary ductal system normal   Pancreas, stomach, spleen, adrenal glands normal.   Kidneys normal enhancement with no hydronephrosis.  Aorta tapers normally.   Small arch bowel loops are normal with no thickening or dilation.  Appendix normal.  Mesentery normal with no inflammatory change or ascites.  No lymphadenopathy.   Possible right ovarian cyst 3.5 cm.  Uterus, adnexa, bladder, rectum otherwise normal.   Bones intact.  Lung bases clear.  Impression:  Right adnexal cyst 3.5 cm.  No other abnormality seen.  Agree with nighthawk.  Last pap: 03/10/2023 NIL -HPV  MM2024   FmHx: MGM c breast cancer. Mother c uterine cancer. Denies ovarian and colon cancers.          Past Medical History:   Diagnosis Date    Hypertension        Past Surgical History:   Procedure Laterality Date    BREAST FIBROADENOMA SURGERY Left      SECTION      ,     DILATION AND CURETTAGE OF UTERUS      , 2009    ENDOMETRIAL ABLATION  2010    INCISIONAL BREAST BIOPSY Left 10/06/2022    per Dr. Devorah Tao - +Benign Tissue w/ Ductal Hyperplasia, Fibrosis and dilated ducts    KNEE CARTILAGE SURGERY Right     placement of breast device Left     TONSILLECTOMY AND ADENOIDECTOMY      TUBAL LIGATION  2010       Family History   Problem Relation Name Age of Onset    Breast cancer Maternal Grandmother  86    Hypertension Father      Diabetes type II Father      Uterine cancer Mother  27    Skin cancer Mother      Hypertension Mother      Diabetes type II Mother      Colon cancer Neg Hx      Ovarian cancer Neg Hx      Cervical cancer Neg Hx         OB History          4    Para   2    Term   2            AB   2    Living   2         SAB   1    IAB        Ectopic        Multiple        Live Births   2                 Current Outpatient Medications on File Prior to Visit   Medication Sig Dispense Refill    phentermine (ADIPEX-P) 37.5 mg tablet Take 37.5 mg by mouth before breakfast.       valsartan (DIOVAN) 80 MG tablet Take 80 mg by mouth. (Patient not taking: Reported on 6/26/2024)       No current facility-administered medications on file prior to visit.       Review of Systems:       Review of Systems   Constitutional:  Negative for chills and fever.   Gastrointestinal:  Negative for abdominal pain, constipation and diarrhea.   Genitourinary:  Positive for menstrual problem and pelvic pain. Negative for bladder incontinence, decreased libido, dysmenorrhea, dyspareunia, dysuria, flank pain, frequency, genital sores, hematuria, hot flashes, menorrhagia, urgency, vaginal bleeding, vaginal discharge, vaginal pain, urinary incontinence, postcoital bleeding, postmenopausal bleeding, vaginal dryness and vaginal odor.  Physical Exam:    /78 (BP Location: Right arm, Patient Position: Sitting, BP Method: Medium (Manual))   Temp 96.1 °F (35.6 °C) (Temporal)   Wt 78.4 kg (172 lb 12.8 oz)   LMP 06/22/2024 (Exact Date)   BMI 29.66 kg/m²     Physical Exam   General Exam:    General Appearance: alert, in no acute distress, normal, well nourished.  Psych:  Orientation: time, place, person.  Mood/Affect: Normal       Assessment:   1. Abnormal uterine bleeding    2. Status post endometrial ablation             Plan:   1. Abnormal uterine bleeding    2. Status post endometrial ablation      Discussed symptoms, labs, and imaging.  Discussed planned procedure and associated risks in detail.  Discussed oophorectomy and associated risks including increased risk for cardiovascular disease, osteoporosis, menopausal symptoms (hot flashes, vaginal dryness, etc), and cognitive decline.     RTC     This note was transcribed by Lucille Canales. There may be transcription errors as a result, however minimal. Effort has been made to ensure accuracy of transcription, but any obvious errors or omissions should be clarified with the author of the document.

## 2024-06-26 ENCOUNTER — OFFICE VISIT (OUTPATIENT)
Dept: OBSTETRICS AND GYNECOLOGY | Facility: CLINIC | Age: 39
End: 2024-06-26
Payer: MEDICAID

## 2024-06-26 VITALS
SYSTOLIC BLOOD PRESSURE: 112 MMHG | BODY MASS INDEX: 29.66 KG/M2 | TEMPERATURE: 96 F | WEIGHT: 172.81 LBS | DIASTOLIC BLOOD PRESSURE: 78 MMHG

## 2024-06-26 DIAGNOSIS — N93.9 ABNORMAL UTERINE BLEEDING: Primary | ICD-10-CM

## 2024-06-26 DIAGNOSIS — Z98.890 STATUS POST ENDOMETRIAL ABLATION: ICD-10-CM

## 2024-06-26 PROCEDURE — 3078F DIAST BP <80 MM HG: CPT | Mod: CPTII,,, | Performed by: OBSTETRICS & GYNECOLOGY

## 2024-06-26 PROCEDURE — 3074F SYST BP LT 130 MM HG: CPT | Mod: CPTII,,, | Performed by: OBSTETRICS & GYNECOLOGY

## 2024-06-26 PROCEDURE — 3008F BODY MASS INDEX DOCD: CPT | Mod: CPTII,,, | Performed by: OBSTETRICS & GYNECOLOGY

## 2024-06-26 PROCEDURE — 4010F ACE/ARB THERAPY RXD/TAKEN: CPT | Mod: CPTII,,, | Performed by: OBSTETRICS & GYNECOLOGY

## 2024-06-26 PROCEDURE — 99214 OFFICE O/P EST MOD 30 MIN: CPT | Mod: ,,, | Performed by: OBSTETRICS & GYNECOLOGY

## 2024-06-26 PROCEDURE — 1159F MED LIST DOCD IN RCRD: CPT | Mod: CPTII,,, | Performed by: OBSTETRICS & GYNECOLOGY

## 2024-06-26 RX ORDER — FAMOTIDINE 20 MG/1
20 TABLET, FILM COATED ORAL
OUTPATIENT
Start: 2024-06-26

## 2024-06-26 RX ORDER — CEFAZOLIN SODIUM 2 G/50ML
2 SOLUTION INTRAVENOUS
OUTPATIENT
Start: 2024-06-26

## 2024-06-26 RX ORDER — SODIUM CHLORIDE 9 MG/ML
INJECTION, SOLUTION INTRAVENOUS CONTINUOUS
OUTPATIENT
Start: 2024-06-26

## 2024-06-26 RX ORDER — MUPIROCIN 20 MG/G
OINTMENT TOPICAL
OUTPATIENT
Start: 2024-06-26

## 2024-06-26 NOTE — H&P (VIEW-ONLY)
History & Physical    Subjective     History of Present Illness:  Patient is a 38 y.o.  presents today to discuss hysterectomy for definitive management of AUB. S/p endometrial ablation.   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      Gyn History:     Menstrual History   Cycle: Yes  Menarche Age: 15 years  Flow Duration: 4  Flow:  (brown spotting)  Interval:  (irregular)  Intermenstrual Bleeding: No  Dysmenorrhea: Yes  Dysmenorrhea Severity : (!) Severe  [place]  Sexually Active: Yes  Sexual Orientation: heterosexual  Postcoital Bleeding: No  Dyspareunia: No  STI History: No  Contraception: No  Menopause  Menopause Age: 1 years  Breast History  Last Breast Imaging Date: Yes  Date: 24 (benign (L) breast u/s benign)  History of Abnormal Breast Imaging : No  History of Breast Biopsy: No  Pap History   Last pap date: 24  Result: Normal (neg w/ neg HPV)  History of Abnormal Pap: No  HPV Vaccine Completed: No     Per previous note 24:  Patient is a 38 y.o.  presents today to discuss hysterectomy. S/p tubal ligation, endometrial ablation. Reports light bothersome bleeding February and again in March followed by heavy menses in March, and April. LMP 24. Denies pain unrelated to bleeding, pain/bleeding with intercourse, abnormal discharge, odor. 5/3/24: +Mycoplasma Hominis.      Per [facility] previous note 5/3/24:  History of Present Illness:  Patient is a 38 y.o.  s/p tubal and ablation presents c/o a five day history of bleeding following urination. States the bleeding started Monday at it's heaviest, not enough to wear a pad but enough to notice in toilet. Bleeding titrated down Tuesday, no bleeding Wednesday. Did note blood yesterday, no bleeding today. Denies dysuria or bladder complaints.  Denies bleeding from vagina, thinks bleeding is from bladder        2024 CT of abdomen and pelvis results to follow:  FINDINGS:  Liver normal enhancement with no focal  lesion.  Gallbladder and biliary ductal system normal   Pancreas, stomach, spleen, adrenal glands normal.   Kidneys normal enhancement with no hydronephrosis.  Aorta tapers normally.   Small arch bowel loops are normal with no thickening or dilation.  Appendix normal.  Mesentery normal with no inflammatory change or ascites.  No lymphadenopathy.   Possible right ovarian cyst 3.5 cm.  Uterus, adnexa, bladder, rectum otherwise normal.   Bones intact.  Lung bases clear.  Impression:  Right adnexal cyst 3.5 cm.  No other abnormality seen.  Agree with nighthawk.  Last pap: 03/10/2023 NIL -HPV  MM2024   FmHx: MGM c breast cancer. Mother c uterine cancer. Denies ovarian and colon cancers.      Chief Complaint   Patient presents with    Vaginal Bleeding     Pt c/o post ablation bleeding        Review of patient's allergies indicates:  No Known Allergies    Current Outpatient Medications   Medication Sig Dispense Refill    phentermine (ADIPEX-P) 37.5 mg tablet Take 37.5 mg by mouth before breakfast.      valsartan (DIOVAN) 80 MG tablet Take 80 mg by mouth. (Patient not taking: Reported on 2024)       No current facility-administered medications for this visit.       Past Medical History:   Diagnosis Date    Hypertension      Past Surgical History:   Procedure Laterality Date    BREAST FIBROADENOMA SURGERY Left      SECTION      ,     DILATION AND CURETTAGE OF UTERUS      ,     ENDOMETRIAL ABLATION  2010    INCISIONAL BREAST BIOPSY Left 10/06/2022    per Dr. Devorah Tao - +Benign Tissue w/ Ductal Hyperplasia, Fibrosis and dilated ducts    KNEE CARTILAGE SURGERY Right     placement of breast device Left     TONSILLECTOMY AND ADENOIDECTOMY      TUBAL LIGATION  2010     Family History   Problem Relation Name Age of Onset    Breast cancer Maternal Grandmother  86    Hypertension Father      Diabetes type II Father      Uterine cancer Mother  27    Skin cancer Mother      Hypertension Mother       Diabetes type II Mother      Colon cancer Neg Hx      Ovarian cancer Neg Hx      Cervical cancer Neg Hx       Social History     Tobacco Use    Smoking status: Never     Passive exposure: Never    Smokeless tobacco: Never   Substance Use Topics    Alcohol use: Not Currently     Comment: Occasionally    Drug use: Never        Review of Systems:  Review of Systems  Constitutional:  Negative for chills and fever.   Gastrointestinal:  Negative for abdominal pain, constipation and diarrhea.   Genitourinary:  Positive for menstrual problem and pelvic pain. Negative for bladder incontinence, decreased libido, dysmenorrhea, dyspareunia, dysuria, flank pain, frequency, genital sores, hematuria, hot flashes, menorrhagia, urgency, vaginal bleeding, vaginal discharge, vaginal pain, urinary incontinence, postcoital bleeding, postmenopausal bleeding, vaginal dryness and vaginal odor.     Objective     Vital Signs (Most Recent)  Temp: 96.1 °F (35.6 °C) (06/26/24 1351)  BP: 112/78 (06/26/24 1351)     78.4 kg (172 lb 12.8 oz)     Physical Exam:  Physical Exam  Physical Exam  Gen: NAD  Cardio: RRR  Lungs CTA b/l  Abd: Soft, NT  Ext: no CCE         Assessment and Plan   1. Abnormal uterine bleeding  - Case Request Operating Room: HYSTERECTOMY, TOTAL, LAPAROSCOPIC  - Full code; Standing  - Vital signs; Standing  - Insert peripheral IV; Standing  - Lepe to Gravity; Standing  - POCT glucose; Standing  - Notify physician if BS > 180 for hysterectomy patients; Standing  - Chlorhexidine (CHG) 2% Wipes; Standing  - Notify Physician/Vital Signs Parameters Urine output less than 0.5mL/kg/hr (with indwelling catheter) or 30 mL/hr (without indwelling catheter) or blood glucose greater than 200 mg/dL; Standing  - Notify physician; Standing  - Notify Physician - Potential Need of Opioid Reversal; Standing  - Diet NPO; Standing  - Chlorohexidine Gluconate Bath; Standing  - Place in Outpatient; Standing  - Place sequential compression device;  Standing  - Comprehensive metabolic panel; Standing  - CBC auto differential; Standing  - Pregnancy, urine rapid; Standing  - Urinalysis, Reflex to Urine Culture; Standing  - Type & Screen Pre Op; Standing    2. Status post endometrial ablation  - Case Request Operating Room: HYSTERECTOMY, TOTAL, LAPAROSCOPIC  - Full code; Standing  - Vital signs; Standing  - Insert peripheral IV; Standing  - Lepe to Gravity; Standing  - POCT glucose; Standing  - Notify physician if BS > 180 for hysterectomy patients; Standing  - Chlorhexidine (CHG) 2% Wipes; Standing  - Notify Physician/Vital Signs Parameters Urine output less than 0.5mL/kg/hr (with indwelling catheter) or 30 mL/hr (without indwelling catheter) or blood glucose greater than 200 mg/dL; Standing  - Notify physician; Standing  - Notify Physician - Potential Need of Opioid Reversal; Standing  - Diet NPO; Standing  - Chlorohexidine Gluconate Bath; Standing  - Place in Outpatient; Standing  - Place sequential compression device; Standing  - Comprehensive metabolic panel; Standing  - CBC auto differential; Standing  - Pregnancy, urine rapid; Standing  - Urinalysis, Reflex to Urine Culture; Standing  - Type & Screen Pre Op; Standing        PLAN:  Discussed symptoms, labs, and imaging.  Discussed planned procedure and associated risks in detail.  Discussed oophorectomy and associated risks including increased risk for cardiovascular disease, osteoporosis, menopausal symptoms (hot flashes, vaginal dryness, etc), and cognitive decline.  Consent given for procedure  NPO after midnight before surgery  Will plan for TLH with ovary sparing on 7/10/24    Pt was instructed to discontinue Mounjaro at this time due to concerns for delayed gastric emptying.  May continue use following surgery     RTC post op     This note was transcribed by Lucille Canales. There may be transcription errors as a result, however minimal. Effort has been made to ensure accuracy of transcription, but any  obvious errors or omissions should be clarified with the author of the document.

## 2024-06-26 NOTE — PROGRESS NOTES
History & Physical    Subjective     History of Present Illness:  Patient is a 38 y.o.  presents today to discuss hysterectomy for definitive management of AUB. S/p endometrial ablation.   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      Gyn History:     Menstrual History   Cycle: Yes  Menarche Age: 15 years  Flow Duration: 4  Flow:  (brown spotting)  Interval:  (irregular)  Intermenstrual Bleeding: No  Dysmenorrhea: Yes  Dysmenorrhea Severity : (!) Severe  [place]  Sexually Active: Yes  Sexual Orientation: heterosexual  Postcoital Bleeding: No  Dyspareunia: No  STI History: No  Contraception: No  Menopause  Menopause Age: 1 years  Breast History  Last Breast Imaging Date: Yes  Date: 24 (benign (L) breast u/s benign)  History of Abnormal Breast Imaging : No  History of Breast Biopsy: No  Pap History   Last pap date: 24  Result: Normal (neg w/ neg HPV)  History of Abnormal Pap: No  HPV Vaccine Completed: No     Per previous note 24:  Patient is a 38 y.o.  presents today to discuss hysterectomy. S/p tubal ligation, endometrial ablation. Reports light bothersome bleeding February and again in March followed by heavy menses in March, and April. LMP 24. Denies pain unrelated to bleeding, pain/bleeding with intercourse, abnormal discharge, odor. 5/3/24: +Mycoplasma Hominis.      Per [facility] previous note 5/3/24:  History of Present Illness:  Patient is a 38 y.o.  s/p tubal and ablation presents c/o a five day history of bleeding following urination. States the bleeding started Monday at it's heaviest, not enough to wear a pad but enough to notice in toilet. Bleeding titrated down Tuesday, no bleeding Wednesday. Did note blood yesterday, no bleeding today. Denies dysuria or bladder complaints.  Denies bleeding from vagina, thinks bleeding is from bladder        2024 CT of abdomen and pelvis results to follow:  FINDINGS:  Liver normal enhancement with no focal  lesion.  Gallbladder and biliary ductal system normal   Pancreas, stomach, spleen, adrenal glands normal.   Kidneys normal enhancement with no hydronephrosis.  Aorta tapers normally.   Small arch bowel loops are normal with no thickening or dilation.  Appendix normal.  Mesentery normal with no inflammatory change or ascites.  No lymphadenopathy.   Possible right ovarian cyst 3.5 cm.  Uterus, adnexa, bladder, rectum otherwise normal.   Bones intact.  Lung bases clear.  Impression:  Right adnexal cyst 3.5 cm.  No other abnormality seen.  Agree with nighthawk.  Last pap: 03/10/2023 NIL -HPV  MM2024   FmHx: MGM c breast cancer. Mother c uterine cancer. Denies ovarian and colon cancers.      Chief Complaint   Patient presents with    Vaginal Bleeding     Pt c/o post ablation bleeding        Review of patient's allergies indicates:  No Known Allergies    Current Outpatient Medications   Medication Sig Dispense Refill    phentermine (ADIPEX-P) 37.5 mg tablet Take 37.5 mg by mouth before breakfast.      valsartan (DIOVAN) 80 MG tablet Take 80 mg by mouth. (Patient not taking: Reported on 2024)       No current facility-administered medications for this visit.       Past Medical History:   Diagnosis Date    Hypertension      Past Surgical History:   Procedure Laterality Date    BREAST FIBROADENOMA SURGERY Left      SECTION      ,     DILATION AND CURETTAGE OF UTERUS      ,     ENDOMETRIAL ABLATION  2010    INCISIONAL BREAST BIOPSY Left 10/06/2022    per Dr. Devorah Tao - +Benign Tissue w/ Ductal Hyperplasia, Fibrosis and dilated ducts    KNEE CARTILAGE SURGERY Right     placement of breast device Left     TONSILLECTOMY AND ADENOIDECTOMY      TUBAL LIGATION  2010     Family History   Problem Relation Name Age of Onset    Breast cancer Maternal Grandmother  86    Hypertension Father      Diabetes type II Father      Uterine cancer Mother  27    Skin cancer Mother      Hypertension Mother       Diabetes type II Mother      Colon cancer Neg Hx      Ovarian cancer Neg Hx      Cervical cancer Neg Hx       Social History     Tobacco Use    Smoking status: Never     Passive exposure: Never    Smokeless tobacco: Never   Substance Use Topics    Alcohol use: Not Currently     Comment: Occasionally    Drug use: Never        Review of Systems:  Review of Systems  Constitutional:  Negative for chills and fever.   Gastrointestinal:  Negative for abdominal pain, constipation and diarrhea.   Genitourinary:  Positive for menstrual problem and pelvic pain. Negative for bladder incontinence, decreased libido, dysmenorrhea, dyspareunia, dysuria, flank pain, frequency, genital sores, hematuria, hot flashes, menorrhagia, urgency, vaginal bleeding, vaginal discharge, vaginal pain, urinary incontinence, postcoital bleeding, postmenopausal bleeding, vaginal dryness and vaginal odor.     Objective     Vital Signs (Most Recent)  Temp: 96.1 °F (35.6 °C) (06/26/24 1351)  BP: 112/78 (06/26/24 1351)     78.4 kg (172 lb 12.8 oz)     Physical Exam:  Physical Exam  Physical Exam  Gen: NAD  Cardio: RRR  Lungs CTA b/l  Abd: Soft, NT  Ext: no CCE         Assessment and Plan   1. Abnormal uterine bleeding  - Case Request Operating Room: HYSTERECTOMY, TOTAL, LAPAROSCOPIC  - Full code; Standing  - Vital signs; Standing  - Insert peripheral IV; Standing  - Lepe to Gravity; Standing  - POCT glucose; Standing  - Notify physician if BS > 180 for hysterectomy patients; Standing  - Chlorhexidine (CHG) 2% Wipes; Standing  - Notify Physician/Vital Signs Parameters Urine output less than 0.5mL/kg/hr (with indwelling catheter) or 30 mL/hr (without indwelling catheter) or blood glucose greater than 200 mg/dL; Standing  - Notify physician; Standing  - Notify Physician - Potential Need of Opioid Reversal; Standing  - Diet NPO; Standing  - Chlorohexidine Gluconate Bath; Standing  - Place in Outpatient; Standing  - Place sequential compression device;  Standing  - Comprehensive metabolic panel; Standing  - CBC auto differential; Standing  - Pregnancy, urine rapid; Standing  - Urinalysis, Reflex to Urine Culture; Standing  - Type & Screen Pre Op; Standing    2. Status post endometrial ablation  - Case Request Operating Room: HYSTERECTOMY, TOTAL, LAPAROSCOPIC  - Full code; Standing  - Vital signs; Standing  - Insert peripheral IV; Standing  - Lepe to Gravity; Standing  - POCT glucose; Standing  - Notify physician if BS > 180 for hysterectomy patients; Standing  - Chlorhexidine (CHG) 2% Wipes; Standing  - Notify Physician/Vital Signs Parameters Urine output less than 0.5mL/kg/hr (with indwelling catheter) or 30 mL/hr (without indwelling catheter) or blood glucose greater than 200 mg/dL; Standing  - Notify physician; Standing  - Notify Physician - Potential Need of Opioid Reversal; Standing  - Diet NPO; Standing  - Chlorohexidine Gluconate Bath; Standing  - Place in Outpatient; Standing  - Place sequential compression device; Standing  - Comprehensive metabolic panel; Standing  - CBC auto differential; Standing  - Pregnancy, urine rapid; Standing  - Urinalysis, Reflex to Urine Culture; Standing  - Type & Screen Pre Op; Standing        PLAN:  Discussed symptoms, labs, and imaging.  Discussed planned procedure and associated risks in detail.  Discussed oophorectomy and associated risks including increased risk for cardiovascular disease, osteoporosis, menopausal symptoms (hot flashes, vaginal dryness, etc), and cognitive decline.  Consent given for procedure  NPO after midnight before surgery  Will plan for TLH with ovary sparing on 7/10/24    Pt was instructed to discontinue Mounjaro at this time due to concerns for delayed gastric emptying.  May continue use following surgery     RTC post op     This note was transcribed by Lucille Canales. There may be transcription errors as a result, however minimal. Effort has been made to ensure accuracy of transcription, but any  obvious errors or omissions should be clarified with the author of the document.

## 2024-07-01 ENCOUNTER — HOSPITAL ENCOUNTER (OUTPATIENT)
Dept: PREADMISSION TESTING | Facility: HOSPITAL | Age: 39
Discharge: HOME | End: 2024-07-01
Attending: OBSTETRICS & GYNECOLOGY
Payer: MEDICAID

## 2024-07-01 VITALS — HEIGHT: 64 IN | WEIGHT: 172.81 LBS | BODY MASS INDEX: 29.5 KG/M2

## 2024-07-01 DIAGNOSIS — N93.9 ABNORMAL UTERINE BLEEDING: ICD-10-CM

## 2024-07-01 DIAGNOSIS — Z98.890 STATUS POST ENDOMETRIAL ABLATION: ICD-10-CM

## 2024-07-01 LAB
ALBUMIN SERPL-MCNC: 4.2 G/DL (ref 3.4–5)
ALBUMIN/GLOB SERPL: 1.6 RATIO
ALP SERPL-CCNC: 45 UNIT/L (ref 50–144)
ALT SERPL-CCNC: 19 UNIT/L (ref 1–45)
ANION GAP SERPL CALC-SCNC: 6 MEQ/L (ref 2–13)
AST SERPL-CCNC: 21 UNIT/L (ref 14–36)
BACTERIA #/AREA URNS AUTO: ABNORMAL /HPF
BASOPHILS # BLD AUTO: 0.03 X10(3)/MCL (ref 0.01–0.08)
BASOPHILS NFR BLD AUTO: 0.5 % (ref 0.1–1.2)
BILIRUB SERPL-MCNC: 0.8 MG/DL (ref 0–1)
BILIRUB UR QL STRIP.AUTO: NEGATIVE
BUN SERPL-MCNC: 10 MG/DL (ref 7–20)
CALCIUM SERPL-MCNC: 8.7 MG/DL (ref 8.4–10.2)
CHLORIDE SERPL-SCNC: 106 MMOL/L (ref 98–110)
CLARITY UR: CLEAR
CO2 SERPL-SCNC: 26 MMOL/L (ref 21–32)
COLOR UR AUTO: YELLOW
CREAT SERPL-MCNC: 0.65 MG/DL (ref 0.66–1.25)
CREAT/UREA NIT SERPL: 15 (ref 12–20)
EOSINOPHIL # BLD AUTO: 0.21 X10(3)/MCL (ref 0.04–0.36)
EOSINOPHIL NFR BLD AUTO: 3.2 % (ref 0.7–7)
ERYTHROCYTE [DISTWIDTH] IN BLOOD BY AUTOMATED COUNT: 12.4 % (ref 11–14.5)
GFR SERPLBLD CREATININE-BSD FMLA CKD-EPI: >90 ML/MIN/1.73/M2
GLOBULIN SER-MCNC: 2.7 GM/DL (ref 2–3.9)
GLUCOSE SERPL-MCNC: 94 MG/DL (ref 70–115)
GLUCOSE UR QL STRIP: NEGATIVE
HCT VFR BLD AUTO: 37.9 % (ref 36–48)
HGB BLD-MCNC: 13.4 G/DL (ref 11.8–16)
HGB UR QL STRIP: ABNORMAL
IMM GRANULOCYTES # BLD AUTO: 0.01 X10(3)/MCL (ref 0–0.03)
IMM GRANULOCYTES NFR BLD AUTO: 0.2 % (ref 0–0.5)
KETONES UR QL STRIP: NEGATIVE
LEUKOCYTE ESTERASE UR QL STRIP: NEGATIVE
LYMPHOCYTES # BLD AUTO: 2.71 X10(3)/MCL (ref 1.16–3.74)
LYMPHOCYTES NFR BLD AUTO: 41.8 % (ref 20–55)
MCH RBC QN AUTO: 30 PG (ref 27–34)
MCHC RBC AUTO-ENTMCNC: 35.4 G/DL (ref 31–37)
MCV RBC AUTO: 85 FL (ref 79–99)
MONOCYTES # BLD AUTO: 0.38 X10(3)/MCL (ref 0.24–0.36)
MONOCYTES NFR BLD AUTO: 5.9 % (ref 4.7–12.5)
MUCOUS THREADS URNS QL MICRO: ABNORMAL /LPF
NEUTROPHILS # BLD AUTO: 3.14 X10(3)/MCL (ref 1.56–6.13)
NEUTROPHILS NFR BLD AUTO: 48.4 % (ref 37–73)
NITRITE UR QL STRIP: NEGATIVE
NRBC BLD AUTO-RTO: 0 %
PH UR STRIP: 6 [PH]
PLATELET # BLD AUTO: 241 X10(3)/MCL (ref 140–371)
PMV BLD AUTO: 11.3 FL (ref 9.4–12.4)
POTASSIUM SERPL-SCNC: 3.7 MMOL/L (ref 3.5–5.1)
PROT SERPL-MCNC: 6.9 GM/DL (ref 6.3–8.2)
PROT UR QL STRIP: NEGATIVE
RBC # BLD AUTO: 4.46 X10(6)/MCL (ref 4–5.1)
RBC #/AREA URNS AUTO: ABNORMAL /HPF
SODIUM SERPL-SCNC: 138 MMOL/L (ref 136–145)
SP GR UR STRIP.AUTO: 1.02 (ref 1–1.03)
SQUAMOUS #/AREA URNS AUTO: ABNORMAL /HPF
UROBILINOGEN UR STRIP-ACNC: 0.2
WBC # BLD AUTO: 6.48 X10(3)/MCL (ref 4–11.5)
WBC #/AREA URNS AUTO: ABNORMAL /HPF

## 2024-07-01 PROCEDURE — 85025 COMPLETE CBC W/AUTO DIFF WBC: CPT | Performed by: OBSTETRICS & GYNECOLOGY

## 2024-07-01 PROCEDURE — 81015 MICROSCOPIC EXAM OF URINE: CPT | Performed by: OBSTETRICS & GYNECOLOGY

## 2024-07-01 PROCEDURE — 81003 URINALYSIS AUTO W/O SCOPE: CPT | Performed by: OBSTETRICS & GYNECOLOGY

## 2024-07-01 PROCEDURE — 80053 COMPREHEN METABOLIC PANEL: CPT | Performed by: OBSTETRICS & GYNECOLOGY

## 2024-07-01 NOTE — DISCHARGE INSTRUCTIONS

## 2024-07-08 ENCOUNTER — ANESTHESIA EVENT (OUTPATIENT)
Dept: SURGERY | Facility: HOSPITAL | Age: 39
End: 2024-07-08
Payer: MEDICAID

## 2024-07-08 NOTE — ANESTHESIA PREPROCEDURE EVALUATION
2024  Luz Barr is a 38 y.o., female.  urgical History    Procedure Laterality Date Comment Source   BREAST FIBROADENOMA SURGERY Left       SECTION   ,     DILATION AND CURETTAGE OF UTERUS   ,     ENDOMETRIAL ABLATION  2010     INCISIONAL BREAST BIOPSY Left 10/06/2022 per Dr. Devorah Tao - +Benign Tissue w/ Ductal Hyperplasia, Fibrosis and dilated ducts    KNEE CARTILAGE SURGERY Right      placement of breast device Left      TONSILLECTOMY AND ADENOIDECTOMY       TUBAL LIGATION  2010       Medical History      Diagnosis Date Comment Source   Hypertension          Specialty: Obstetrics and Gynecology     Progress Notes     Signed     Encounter Date: 2024   Related encounter: Office Visit from 2024 in [facility]     Expand All Collapse All  History & Physical        Subjective  History of Present Illness:  Patient is a 38 y.o.  presents today to discuss hysterectomy for definitive management of AUB. S/p endometrial ablation.   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      Gyn History:     Menstrual History   Cycle: Yes  Menarche Age: 15 years  Flow Duration: 4  Flow:  (brown spotting)  Interval:  (irregular)  Intermenstrual Bleeding: No  Dysmenorrhea: Yes  Dysmenorrhea Severity : (!) Severe  [place]  Sexually Active: Yes  Sexual Orientation: heterosexual  Postcoital Bleeding: No  Dyspareunia: No  STI History: No  Contraception: No  Menopause  Menopause Age: 1 years  Breast History  Last Breast Imaging Date: Yes  Date: 24 (benign (L) breast u/s benign)  History of Abnormal Breast Imaging : No  History of Breast Biopsy: No  Pap History   Last pap date: 24  Result: Normal (neg w/ neg HPV)  History of Abnormal Pap: No  HPV Vaccine Completed: No     Per previous note 24:  Patient is a 38 y.o.  presents today to  discuss hysterectomy. S/p tubal ligation, endometrial ablation. Reports light bothersome bleeding February and again in March followed by heavy menses in March, and April. LMP 24. Denies pain unrelated to bleeding, pain/bleeding with intercourse, abnormal discharge, odor. 5/3/24: +Mycoplasma Hominis.      Per SL previous note 5/3/24:  History of Present Illness:  Patient is a 38 y.o.  s/p tubal and ablation presents c/o a five day history of bleeding following urination. States the bleeding started Monday at it's heaviest, not enough to wear a pad but enough to notice in toilet. Bleeding titrated down Tuesday, no bleeding Wednesday. Did note blood yesterday, no bleeding today. Denies dysuria or bladder complaints.  Denies bleeding from vagina, thinks bleeding is from bladder        2024 CT of abdomen and pelvis results to follow:  FINDINGS:  Liver normal enhancement with no focal lesion.  Gallbladder and biliary ductal system normal   Pancreas, stomach, spleen, adrenal glands normal.   Kidneys normal enhancement with no hydronephrosis.  Aorta tapers normally.   Small arch bowel loops are normal with no thickening or dilation.  Appendix normal.  Mesentery normal with no inflammatory change or ascites.  No lymphadenopathy.   Possible right ovarian cyst 3.5 cm.  Uterus, adnexa, bladder, rectum otherwise normal.   Bones intact.  Lung bases clear.  Impression:  Right adnexal cyst 3.5 cm.  No other abnormality seen.  Agree with [facility].  Last pap: 03/10/2023 NIL -HPV  [id number]   FmHx: MGM c breast cancer. Mother c uterine cancer. Denies ovarian and colon cancers.           Chief Complaint   Patient presents with    Vaginal Bleeding       Pt c/o post ablation bleeding          Review of patient's allergies indicates:  No Known Allergies     Current Medications          Current Outpatient Medications   Medication Sig Dispense Refill    phentermine (ADIPEX-P) 37.5 mg tablet Take 37.5 mg by mouth  before breakfast.        valsartan (DIOVAN) 80 MG tablet Take 80 mg by mouth. (Patient not taking: Reported on 2024)          No current facility-administered medications for this visit.                 Past Medical History:   Diagnosis Date    Hypertension              Past Surgical History:   Procedure Laterality Date    BREAST FIBROADENOMA SURGERY Left       SECTION         ,     DILATION AND CURETTAGE OF UTERUS         ,     ENDOMETRIAL ABLATION   2010    INCISIONAL BREAST BIOPSY Left 10/06/2022     per Dr. Devorah Tao - +Benign Tissue w/ Ductal Hyperplasia, Fibrosis and dilated ducts    KNEE CARTILAGE SURGERY Right      placement of breast device Left      TONSILLECTOMY AND ADENOIDECTOMY        TUBAL LIGATION   2010             Family History   Problem Relation Name Age of Onset    Breast cancer Maternal Grandmother   86    Hypertension Father        Diabetes type II Father        Uterine cancer Mother   27    Skin cancer Mother        Hypertension Mother        Diabetes type II Mother        Colon cancer Neg Hx        Ovarian cancer Neg Hx        Cervical cancer Neg Hx          Social History   Social History            Tobacco Use    Smoking status: Never       Passive exposure: Never    Smokeless tobacco: Never   Substance Use Topics    Alcohol use: Not Currently       Comment: Occasionally    Drug use: Never            Review of Systems:  Review of Systems  Constitutional:  Negative for chills and fever.   Gastrointestinal:  Negative for abdominal pain, constipation and diarrhea.   Genitourinary:  Positive for menstrual problem and pelvic pain. Negative for bladder incontinence, decreased libido, dysmenorrhea, dyspareunia, dysuria, flank pain, frequency, genital sores, hematuria, hot flashes, menorrhagia, urgency, vaginal bleeding, vaginal discharge, vaginal pain, urinary incontinence, postcoital bleeding, postmenopausal bleeding, vaginal dryness and vaginal odor.            Objective  Vital Signs (Most Recent)  Temp: 96.1 °F (35.6 °C) (06/26/24 1351)  BP: 112/78 (06/26/24 1351)  78.4 kg (172 lb 12.8 oz)      Physical Exam:  Physical Exam  Physical Exam  Gen: NAD  Cardio: RRR  Lungs CTA b/l  Abd: Soft, NT  Ext: no CC        Pre-op Assessment    I have reviewed the Patient Summary Reports.     I have reviewed the Nursing Notes. I have reviewed the NPO Status.   I have reviewed the Medications.     Review of Systems  Anesthesia Hx:  No problems with previous Anesthesia             Denies Family Hx of Anesthesia complications.    Denies Personal Hx of Anesthesia complications.                    Social:  Non-Smoker       Hematology/Oncology:  Hematology Normal   Oncology Normal                                   EENT/Dental:  EENT/Dental Normal           Cardiovascular:  Exercise tolerance: poor   Hypertension                                        Pulmonary:  Pulmonary Normal                       Renal/:  Renal/ Normal                 Hepatic/GI:  Hepatic/GI Normal                 Musculoskeletal:  Musculoskeletal Normal                Neurological:  Neurology Normal                                      Endocrine:  Endocrine Normal            Dermatological:  Skin Normal    Psych:  Psychiatric Normal                    Physical Exam  General: Well nourished, Cooperative, Alert and Oriented    Airway:  Mallampati: II / II  Mouth Opening: Normal  TM Distance: Normal  Tongue: Normal  Neck ROM: Normal ROM    Dental:  Intact        Anesthesia Plan  Type of Anesthesia, risks & benefits discussed:    Anesthesia Type: Gen ETT  Intra-op Monitoring Plan: Standard ASA Monitors  Post Op Pain Control Plan: multimodal analgesia  Induction:  IV  Airway Plan: Direct  Informed Consent: Informed consent signed with the Patient and all parties understand the risks and agree with anesthesia plan.  All questions answered. Patient consented to blood products? Yes  ASA Score: 2  Day of Surgery Review of  History & Physical: H&P Update referred to the surgeon/provider.I have interviewed and examined the patient. I have reviewed the patient's H&P dated: There are no significant changes.     Ready For Surgery From Anesthesia Perspective.     .

## 2024-07-10 ENCOUNTER — ANESTHESIA (OUTPATIENT)
Dept: SURGERY | Facility: HOSPITAL | Age: 39
End: 2024-07-10
Payer: MEDICAID

## 2024-07-10 ENCOUNTER — HOSPITAL ENCOUNTER (OUTPATIENT)
Facility: HOSPITAL | Age: 39
Discharge: HOME | End: 2024-07-10
Attending: OBSTETRICS & GYNECOLOGY | Admitting: OBSTETRICS & GYNECOLOGY
Payer: MEDICAID

## 2024-07-10 VITALS
OXYGEN SATURATION: 99 % | HEIGHT: 64 IN | HEART RATE: 70 BPM | TEMPERATURE: 97 F | BODY MASS INDEX: 29.5 KG/M2 | WEIGHT: 172.81 LBS | RESPIRATION RATE: 20 BRPM | SYSTOLIC BLOOD PRESSURE: 107 MMHG | DIASTOLIC BLOOD PRESSURE: 75 MMHG

## 2024-07-10 DIAGNOSIS — Z90.710 STATUS POST LAPAROSCOPIC HYSTERECTOMY: ICD-10-CM

## 2024-07-10 DIAGNOSIS — Z98.890 STATUS POST ENDOMETRIAL ABLATION: ICD-10-CM

## 2024-07-10 DIAGNOSIS — N93.9 ABNORMAL UTERINE BLEEDING: Primary | ICD-10-CM

## 2024-07-10 LAB
ABO AND RH: NORMAL
ABORH RETYPE: NORMAL
ANTIBODY SCREEN: NORMAL
B-HCG UR QL: NEGATIVE
HCT VFR BLD AUTO: 41.3 % (ref 36–48)
HGB BLD-MCNC: 14 G/DL (ref 11.8–16)
SPECIMEN OUTDATE: NORMAL

## 2024-07-10 PROCEDURE — 86900 BLOOD TYPING SEROLOGIC ABO: CPT | Performed by: OBSTETRICS & GYNECOLOGY

## 2024-07-10 PROCEDURE — 37000009 HC ANESTHESIA EA ADD 15 MINS: Performed by: OBSTETRICS & GYNECOLOGY

## 2024-07-10 PROCEDURE — 71000033 HC RECOVERY, INTIAL HOUR: Performed by: OBSTETRICS & GYNECOLOGY

## 2024-07-10 PROCEDURE — 71000016 HC POSTOP RECOV ADDL HR: Performed by: OBSTETRICS & GYNECOLOGY

## 2024-07-10 PROCEDURE — 63600175 PHARM REV CODE 636 W HCPCS: Mod: JZ,JG | Performed by: OBSTETRICS & GYNECOLOGY

## 2024-07-10 PROCEDURE — 37000008 HC ANESTHESIA 1ST 15 MINUTES: Performed by: OBSTETRICS & GYNECOLOGY

## 2024-07-10 PROCEDURE — 63600175 PHARM REV CODE 636 W HCPCS: Performed by: OBSTETRICS & GYNECOLOGY

## 2024-07-10 PROCEDURE — 36000711: Performed by: OBSTETRICS & GYNECOLOGY

## 2024-07-10 PROCEDURE — 36415 COLL VENOUS BLD VENIPUNCTURE: CPT | Performed by: OBSTETRICS & GYNECOLOGY

## 2024-07-10 PROCEDURE — 58571 TLH W/T/O 250 G OR LESS: CPT | Mod: AS,,,

## 2024-07-10 PROCEDURE — 81025 URINE PREGNANCY TEST: CPT | Performed by: OBSTETRICS & GYNECOLOGY

## 2024-07-10 PROCEDURE — 25000003 PHARM REV CODE 250: Performed by: OBSTETRICS & GYNECOLOGY

## 2024-07-10 PROCEDURE — 85014 HEMATOCRIT: CPT | Performed by: OBSTETRICS & GYNECOLOGY

## 2024-07-10 PROCEDURE — 36000710: Performed by: OBSTETRICS & GYNECOLOGY

## 2024-07-10 PROCEDURE — 71000015 HC POSTOP RECOV 1ST HR: Performed by: OBSTETRICS & GYNECOLOGY

## 2024-07-10 PROCEDURE — 27201423 OPTIME MED/SURG SUP & DEVICES STERILE SUPPLY: Performed by: OBSTETRICS & GYNECOLOGY

## 2024-07-10 PROCEDURE — 25000003 PHARM REV CODE 250: Performed by: NURSE ANESTHETIST, CERTIFIED REGISTERED

## 2024-07-10 PROCEDURE — 58571 TLH W/T/O 250 G OR LESS: CPT | Mod: ,,, | Performed by: OBSTETRICS & GYNECOLOGY

## 2024-07-10 PROCEDURE — 63600175 PHARM REV CODE 636 W HCPCS: Performed by: NURSE ANESTHETIST, CERTIFIED REGISTERED

## 2024-07-10 PROCEDURE — C1729 CATH, DRAINAGE: HCPCS | Performed by: OBSTETRICS & GYNECOLOGY

## 2024-07-10 RX ORDER — SODIUM CHLORIDE 9 MG/ML
INJECTION, SOLUTION INTRAVENOUS CONTINUOUS
Status: DISCONTINUED | OUTPATIENT
Start: 2024-07-10 | End: 2024-07-10 | Stop reason: HOSPADM

## 2024-07-10 RX ORDER — MUPIROCIN 20 MG/G
OINTMENT TOPICAL
Status: DISCONTINUED | OUTPATIENT
Start: 2024-07-10 | End: 2024-07-10 | Stop reason: HOSPADM

## 2024-07-10 RX ORDER — IBUPROFEN 600 MG/1
600 TABLET ORAL EVERY 6 HOURS
Status: DISCONTINUED | OUTPATIENT
Start: 2024-07-11 | End: 2024-07-10 | Stop reason: HOSPADM

## 2024-07-10 RX ORDER — BISACODYL 10 MG/1
10 SUPPOSITORY RECTAL DAILY PRN
Status: DISCONTINUED | OUTPATIENT
Start: 2024-07-10 | End: 2024-07-10 | Stop reason: HOSPADM

## 2024-07-10 RX ORDER — DIPHENHYDRAMINE HYDROCHLORIDE 50 MG/ML
25 INJECTION INTRAMUSCULAR; INTRAVENOUS EVERY 4 HOURS PRN
Status: DISCONTINUED | OUTPATIENT
Start: 2024-07-10 | End: 2024-07-10 | Stop reason: HOSPADM

## 2024-07-10 RX ORDER — HYDROCODONE BITARTRATE AND ACETAMINOPHEN 7.5; 325 MG/1; MG/1
1 TABLET ORAL EVERY 6 HOURS PRN
Qty: 12 TABLET | Refills: 0 | Status: SHIPPED | OUTPATIENT
Start: 2024-07-10

## 2024-07-10 RX ORDER — ONDANSETRON 4 MG/1
8 TABLET, ORALLY DISINTEGRATING ORAL EVERY 8 HOURS PRN
Status: DISCONTINUED | OUTPATIENT
Start: 2024-07-10 | End: 2024-07-10 | Stop reason: HOSPADM

## 2024-07-10 RX ORDER — BUPIVACAINE HYDROCHLORIDE 5 MG/ML
INJECTION, SOLUTION EPIDURAL; INTRACAUDAL
Status: DISCONTINUED | OUTPATIENT
Start: 2024-07-10 | End: 2024-07-10 | Stop reason: HOSPADM

## 2024-07-10 RX ORDER — KETOROLAC TROMETHAMINE 30 MG/ML
30 INJECTION, SOLUTION INTRAMUSCULAR; INTRAVENOUS EVERY 8 HOURS
Status: DISCONTINUED | OUTPATIENT
Start: 2024-07-10 | End: 2024-07-10 | Stop reason: HOSPADM

## 2024-07-10 RX ORDER — SODIUM CHLORIDE, SODIUM LACTATE, POTASSIUM CHLORIDE, CALCIUM CHLORIDE 600; 310; 30; 20 MG/100ML; MG/100ML; MG/100ML; MG/100ML
INJECTION, SOLUTION INTRAVENOUS CONTINUOUS
Status: DISCONTINUED | OUTPATIENT
Start: 2024-07-10 | End: 2024-07-10 | Stop reason: HOSPADM

## 2024-07-10 RX ORDER — ONDANSETRON HYDROCHLORIDE 2 MG/ML
INJECTION, SOLUTION INTRAVENOUS
Status: DISCONTINUED | OUTPATIENT
Start: 2024-07-10 | End: 2024-07-10

## 2024-07-10 RX ORDER — KETOROLAC TROMETHAMINE 30 MG/ML
INJECTION, SOLUTION INTRAMUSCULAR; INTRAVENOUS
Status: DISCONTINUED | OUTPATIENT
Start: 2024-07-10 | End: 2024-07-10

## 2024-07-10 RX ORDER — HYDROMORPHONE HYDROCHLORIDE 1 MG/ML
1 INJECTION, SOLUTION INTRAMUSCULAR; INTRAVENOUS; SUBCUTANEOUS EVERY 6 HOURS PRN
Status: DISCONTINUED | OUTPATIENT
Start: 2024-07-10 | End: 2024-07-10 | Stop reason: HOSPADM

## 2024-07-10 RX ORDER — FAMOTIDINE 20 MG/1
20 TABLET, FILM COATED ORAL
Status: COMPLETED | OUTPATIENT
Start: 2024-07-10 | End: 2024-07-10

## 2024-07-10 RX ORDER — OXYCODONE AND ACETAMINOPHEN 5; 325 MG/1; MG/1
1 TABLET ORAL EVERY 4 HOURS PRN
Status: DISCONTINUED | OUTPATIENT
Start: 2024-07-10 | End: 2024-07-10 | Stop reason: HOSPADM

## 2024-07-10 RX ORDER — VECURONIUM BROMIDE FOR INJECTION 1 MG/ML
INJECTION, POWDER, LYOPHILIZED, FOR SOLUTION INTRAVENOUS
Status: DISCONTINUED | OUTPATIENT
Start: 2024-07-10 | End: 2024-07-10

## 2024-07-10 RX ORDER — SODIUM CHLORIDE 9 MG/ML
INJECTION, SOLUTION INTRAVENOUS CONTINUOUS PRN
Status: DISCONTINUED | OUTPATIENT
Start: 2024-07-10 | End: 2024-07-10

## 2024-07-10 RX ORDER — ACETAMINOPHEN 10 MG/ML
INJECTION, SOLUTION INTRAVENOUS
Status: DISCONTINUED | OUTPATIENT
Start: 2024-07-10 | End: 2024-07-10

## 2024-07-10 RX ORDER — OXYCODONE AND ACETAMINOPHEN 10; 325 MG/1; MG/1
1 TABLET ORAL EVERY 4 HOURS PRN
Status: DISCONTINUED | OUTPATIENT
Start: 2024-07-10 | End: 2024-07-10 | Stop reason: HOSPADM

## 2024-07-10 RX ORDER — FAMOTIDINE 20 MG/1
20 TABLET, FILM COATED ORAL
Status: DISCONTINUED | OUTPATIENT
Start: 2024-07-10 | End: 2024-07-10

## 2024-07-10 RX ORDER — PROPOFOL 10 MG/ML
VIAL (ML) INTRAVENOUS
Status: DISCONTINUED | OUTPATIENT
Start: 2024-07-10 | End: 2024-07-10

## 2024-07-10 RX ORDER — DIPHENHYDRAMINE HCL 25 MG
25 CAPSULE ORAL EVERY 4 HOURS PRN
Status: DISCONTINUED | OUTPATIENT
Start: 2024-07-10 | End: 2024-07-10 | Stop reason: HOSPADM

## 2024-07-10 RX ORDER — MUPIROCIN 20 MG/G
OINTMENT TOPICAL 2 TIMES DAILY
Status: DISCONTINUED | OUTPATIENT
Start: 2024-07-10 | End: 2024-07-10 | Stop reason: HOSPADM

## 2024-07-10 RX ORDER — GLYCOPYRROLATE 0.2 MG/ML
0.2 INJECTION INTRAMUSCULAR; INTRAVENOUS
Status: COMPLETED | OUTPATIENT
Start: 2024-07-10 | End: 2024-07-10

## 2024-07-10 RX ORDER — DEXAMETHASONE SODIUM PHOSPHATE 4 MG/ML
INJECTION, SOLUTION INTRA-ARTICULAR; INTRALESIONAL; INTRAMUSCULAR; INTRAVENOUS; SOFT TISSUE
Status: DISCONTINUED | OUTPATIENT
Start: 2024-07-10 | End: 2024-07-10

## 2024-07-10 RX ORDER — LIDOCAINE HYDROCHLORIDE 20 MG/ML
INJECTION, SOLUTION EPIDURAL; INFILTRATION; INTRACAUDAL; PERINEURAL
Status: DISCONTINUED | OUTPATIENT
Start: 2024-07-10 | End: 2024-07-10

## 2024-07-10 RX ORDER — MIDAZOLAM HYDROCHLORIDE 1 MG/ML
2 INJECTION INTRAMUSCULAR; INTRAVENOUS
Status: COMPLETED | OUTPATIENT
Start: 2024-07-10 | End: 2024-07-10

## 2024-07-10 RX ORDER — FENTANYL CITRATE 50 UG/ML
INJECTION, SOLUTION INTRAMUSCULAR; INTRAVENOUS
Status: DISCONTINUED | OUTPATIENT
Start: 2024-07-10 | End: 2024-07-10

## 2024-07-10 RX ADMIN — FENTANYL CITRATE 100 MCG: 50 INJECTION, SOLUTION INTRAMUSCULAR; INTRAVENOUS at 08:07

## 2024-07-10 RX ADMIN — FAMOTIDINE 20 MG: 20 TABLET, FILM COATED ORAL at 08:07

## 2024-07-10 RX ADMIN — DEXAMETHASONE SODIUM PHOSPHATE 4 MG: 4 INJECTION, SOLUTION INTRA-ARTICULAR; INTRALESIONAL; INTRAMUSCULAR; INTRAVENOUS; SOFT TISSUE at 09:07

## 2024-07-10 RX ADMIN — SODIUM CHLORIDE: 9 INJECTION, SOLUTION INTRAVENOUS at 08:07

## 2024-07-10 RX ADMIN — ACETAMINOPHEN 1000 MG: 1000 INJECTION, SOLUTION INTRAVENOUS at 08:07

## 2024-07-10 RX ADMIN — LIDOCAINE HYDROCHLORIDE 5 ML: 20 INJECTION, SOLUTION EPIDURAL; INFILTRATION; INTRACAUDAL; PERINEURAL at 08:07

## 2024-07-10 RX ADMIN — KETOROLAC TROMETHAMINE 30 MG: 30 INJECTION, SOLUTION INTRAMUSCULAR; INTRAVENOUS at 10:07

## 2024-07-10 RX ADMIN — VECURONIUM BROMIDE 7 MG: 10 INJECTION, POWDER, FOR SOLUTION INTRAVENOUS at 08:07

## 2024-07-10 RX ADMIN — PROPOFOL 150 MG: 10 INJECTION, EMULSION INTRAVENOUS at 08:07

## 2024-07-10 RX ADMIN — ONDANSETRON 4 MG: 2 INJECTION INTRAMUSCULAR; INTRAVENOUS at 09:07

## 2024-07-10 RX ADMIN — MIDAZOLAM HYDROCHLORIDE 2 MG: 1 INJECTION, SOLUTION INTRAMUSCULAR; INTRAVENOUS at 08:07

## 2024-07-10 RX ADMIN — HYDROMORPHONE HYDROCHLORIDE 1 MG: 1 INJECTION, SOLUTION INTRAMUSCULAR; INTRAVENOUS; SUBCUTANEOUS at 10:07

## 2024-07-10 RX ADMIN — SUGAMMADEX 300 MG: 100 INJECTION, SOLUTION INTRAVENOUS at 10:07

## 2024-07-10 RX ADMIN — FENTANYL CITRATE 50 MCG: 50 INJECTION, SOLUTION INTRAMUSCULAR; INTRAVENOUS at 09:07

## 2024-07-10 RX ADMIN — GLYCOPYRROLATE 0.2 MG: 0.2 INJECTION INTRAMUSCULAR; INTRAVENOUS at 08:07

## 2024-07-10 RX ADMIN — VECURONIUM BROMIDE 3 MG: 10 INJECTION, POWDER, FOR SOLUTION INTRAVENOUS at 09:07

## 2024-07-10 RX ADMIN — CEFAZOLIN 2 G: 2 INJECTION, POWDER, FOR SOLUTION INTRAMUSCULAR; INTRAVENOUS at 09:07

## 2024-07-10 NOTE — PLAN OF CARE
Received from PACU per stretcher, awake and alert. Sites x3 with Dermabond, dry and intact. IV patient and site clear, attached to IV infusion pump @ 125ml/hr. SCDS on and working. Voices no complaints at present. Iced water served and tolerating well.

## 2024-07-10 NOTE — PLAN OF CARE
Voices no complaints at present.    Micah Hansen, PGY-1  Internal Medicine  [phone] / [phone] or [employer] Teams    HPI  81y m W/ PMHx HTN presenting to the ED from [facility] as a transfer for R frontal parenchymal hemorrhage on CT after fall at home. Patient is poor historian but states that he was diagnosed with COVID 2 weeks ago and has been feeling "tired" at home, states he felt dizzy earlier today but unable to state what time he fell or further elucidate. Patient denies AC use. Per EMS was also recently diagnosed with a AAA but has been unable to undergo surgical repair due to recent dx of COVID. Patient still endorsing he feels "tired", denies current headache, vision changes, dizziness, chest pain, nausea, back pain, focal numbness/tingling. Endorses weakness to LLE 2/2 pain.     PROGRESS NOTE:     Patient is a 81y old  Male who presents with a chief complaint of R ICH (24 Jan 2022 18:43)    SUBJECTIVE/OVERNIGHT EVENTS:       ADDITIONAL REVIEW OF SYSTEMS:      MEDICATIONS  (STANDING):  acetylcysteine 20%  Inhalation 4 milliLiter(s) Inhalation every 6 hours  albuterol/ipratropium for Nebulization 3 milliLiter(s) Nebulizer every 6 hours  amLODIPine   Tablet 10 milliGRAM(s) Oral daily  cefepime   IVPB      cefepime   IVPB 1000 milliGRAM(s) IV Intermittent every 8 hours  dextrose 40% Gel 15 Gram(s) Oral once  dextrose 5%. 1000 milliLiter(s) (50 mL/Hr) IV Continuous <Continuous>  dextrose 50% Injectable 25 Gram(s) IV Push once  dextrose 50% Injectable 12.5 Gram(s) IV Push once  dextrose 50% Injectable 25 Gram(s) IV Push once  enoxaparin Injectable 40 milliGRAM(s) SubCutaneous every 12 hours  glucagon  Injectable 1 milliGRAM(s) IntraMuscular once  insulin lispro (ADMELOG) corrective regimen sliding scale   SubCutaneous every 6 hours  insulin NPH human recombinant 13 Unit(s) SubCutaneous every 6 hours  lacosamide Solution 100 milliGRAM(s) Oral two times a day  losartan 100 milliGRAM(s) Oral daily  nystatin Powder 1 Application(s) Topical two times a day  senna 2 Tablet(s) Oral at bedtime  sodium chloride 3%  Inhalation 4 milliLiter(s) Inhalation every 6 hours    MEDICATIONS  (PRN):  acetaminophen    Suspension .. 650 milliGRAM(s) Oral every 6 hours PRN Temp greater or equal to 38C (100.4F), Mild Pain (1 - 3)      CAPILLARY BLOOD GLUCOSE    POCT Blood Glucose.: 176 mg/dL (29 Jan 2022 05:29)      I&O's Summary    28 Jan 2022 07:01  -  29 Jan 2022 07:00  --------------------------------------------------------  IN: 0 mL / OUT: 975 mL / NET: -975 mL        PHYSICAL EXAM:  Vital Signs Last 24 Hrs  T(C): 36.4 (29 Jan 2022 04:33), Max: 37.2 (28 Jan 2022 13:41)  T(F): 97.5 (29 Jan 2022 04:33), Max: 98.9 (28 Jan 2022 13:41)  HR: 93 (29 Jan 2022 04:33) (85 - 93)  BP: 111/66 (29 Jan 2022 04:33) (111/66 - 148/71)  RR: 18 (29 Jan 2022 04:33) (18 - 22)  SpO2: 95% (29 Jan 2022 04:33) (95% - 98%)    CONSTITUTIONAL: NAD, in bed lethargic with face tent on  HEENT: NC/AT, EOMI  RESPIRATORY: No increased work of breathing, CTAB, no wheezes or crackles appreciated  CARDIOVASCULAR: RRR, S1 and S2 present, no m/r/g  ABDOMEN: soft, NT, ND, bowel sounds present  EXTREMITIES: No LE edema, myoclonus present in upper extremities, R>L. in upper extremity mittens  MUSCULOSKELETAL: no joint swelling or tenderness to palpation  NEURO: A&Ox0, too lethargic to participate in exam      LABS:                        12.0   6.72  )-----------( 205      ( 28 Jan 2022 07:20 )             38.1     01-28    146<H>  |  102  |  39<H>  ----------------------------<  191<H>  4.0   |  32<H>  |  0.97    Ca    10.7<H>      28 Jan 2022 07:23  Phos  3.8     01-28  Mg     2.6     01-28      Culture - Blood (collected 25 Jan 2022 13:34)  Source: .Blood Blood-Venous  Preliminary Report (26 Jan 2022 14:02):    No growth to date.    Culture - Blood (collected 25 Jan 2022 13:34)  Source: .Blood Blood-Peripheral  Preliminary Report (26 Jan 2022 14:02):    No growth to date.        RADIOLOGY & ADDITIONAL TESTS:  CT HEAD 1/27    This exam is compared prior head CT performed on January 23, 2020.    Again seen is abnormal high attenuation identified involving the high right frontal cortical subcortical region. This compatible with acute parenchymal hemorrhage surrounding edema. This acute parenchymal hemorrhage measures approximately 7.3 x 3.2 cm and previously measured approximately 7.7 x 3.5 cm. Localized mass effect is seen consisting of sulcal effacement.    Extra-axial low-attenuation with adjacent bony scalloping is again seen involving the high left frontal region. This finding measures approximately 4.2 x 2.8 cm and previously measured approximately 4.1 x 3.1 cm. This is compatible with an arachnoid cyst.    Parenchymal volume loss and chronic microvascular ischemic changes are identified.    Intraventricular hemorrhage is again seen.    Evaluation of structures with the appropriate window appears unremarkable    Left-sided NG tube is seen.    The visualized paranasal sinuses mastoid and middle ear regions appear clear.    Impression: Acute parenchymal hemorrhage is again seen with some expected evolutionary changes.    Arachnoid cyst again seen as described above.      CT CHEST 1/26  FINDINGS:    LYMPH NODES: Small mediastinal lymph nodes are similar in appearance to 1/15/2022.    HEART/VASCULATURE: Heart size is normal. Aortic valve and coronary artery calcifications.    AIRWAYS/LUNGS/PLEURA: Evaluation is limited secondary to respiratory motion artifact. Grossly patent central airways. New patchy opacities are seen in the dependent portions of the right upper lobe and bilateral lower lobes.    UPPER ABDOMEN:Enteric tube tip is in the proximal stomach just distal to the gastroesophageal junction. Ingested material is noted within the esophagus.    Redemonstrated atrophic pancreas with a 1 cm calcification in the head of the pancreas. Numerous additional calcifications suggest chronic pancreatitis.    Hepatic cysts adjacent to the gallbladder fossa. Trace layering densities within the gallbladder likely representing cholelithiasis.    BONES/SOFT TISSUES: Multiple old left rib fracture is. A 1.9 cm subpleural lipoma is noted adjacent to one of the old fractures. (3:65).    IMPRESSION:    1. New patchy bilateral dependent opacities, could occur in the setting of pneumonia (? Clinical history of aspiration given its distribution).      CXR 1/25  IMPRESSION: Patchy airspace opacities in the left lung, may represent asymmetric pulmonary edema or atypical/viral pneumonia.    CT HEAD 1/23  FINDINGS:  Large right frontal lobe parenchymal hematoma measures approximately 7.7 x 3.5 cm, previously 7.5 x 3.2 cm, with surrounding edema and mass effect, without interval interval change compared to prior. Redemonstrated small intraventricular hemorrhage, similar to prior.    Moderate cerebral volume loss with proportional prominence of the sulci and ventricles. Nonspecific white matter hypoattenuation, likely related to chronic microvascular changes.    Left frontal arachnoid cyst, unchanged.    Mucosal thickening of the left maxillary and bilateral ethmoid sinuses. Partially visualized nasogastric tube in situ. Mastoid air cells are clear.    Evidence of bilateral lens surgery.    No displaced calvarial fracture.      IMPRESSION:  Stable exam.   Micah Hansen, PGY-1  Internal Medicine  [phone] or [employer] Teams    HPI  81y m W/ PMHx HTN presenting to the ED from [facility] as a transfer for R frontal parenchymal hemorrhage on CT after fall at home. Patient is poor historian but states that he was diagnosed with COVID 2 weeks ago and has been feeling "tired" at home, states he felt dizzy earlier today but unable to state what time he fell or further elucidate. Patient denies AC use. Per EMS was also recently diagnosed with a AAA but has been unable to undergo surgical repair due to recent dx of COVID. Patient still endorsing he feels "tired", denies current headache, vision changes, dizziness, chest pain, nausea, back pain, focal numbness/tingling. Endorses weakness to LLE 2/2 pain.     PROGRESS NOTE:     Patient is a 81y old  Male who presents with a chief complaint of R ICH (24 Jan 2022 18:43)    SUBJECTIVE/OVERNIGHT EVENTS:   Patient was seen and examined at bedside this morning. Overnight his NGT was clogged. He was altered and hypoxic to 90%, less active than his baseline, not responding to verbal or tactile stimulation. He was placed on NRB and had a max O2sat of 90%. RRT was called for hypoxia and altered mental status. He was intubated and brought to the MICU for further workup and management.     ADDITIONAL REVIEW OF SYSTEMS:      MEDICATIONS  (STANDING):  acetylcysteine 20%  Inhalation 4 milliLiter(s) Inhalation every 6 hours  albuterol/ipratropium for Nebulization 3 milliLiter(s) Nebulizer every 6 hours  amLODIPine   Tablet 10 milliGRAM(s) Oral daily  cefepime   IVPB      cefepime   IVPB 1000 milliGRAM(s) IV Intermittent every 8 hours  dextrose 40% Gel 15 Gram(s) Oral once  dextrose 5%. 1000 milliLiter(s) (50 mL/Hr) IV Continuous <Continuous>  dextrose 50% Injectable 25 Gram(s) IV Push once  dextrose 50% Injectable 12.5 Gram(s) IV Push once  dextrose 50% Injectable 25 Gram(s) IV Push once  enoxaparin Injectable 40 milliGRAM(s) SubCutaneous every 12 hours  glucagon  Injectable 1 milliGRAM(s) IntraMuscular once  insulin lispro (ADMELOG) corrective regimen sliding scale   SubCutaneous every 6 hours  insulin NPH human recombinant 13 Unit(s) SubCutaneous every 6 hours  lacosamide Solution 100 milliGRAM(s) Oral two times a day  losartan 100 milliGRAM(s) Oral daily  nystatin Powder 1 Application(s) Topical two times a day  senna 2 Tablet(s) Oral at bedtime  sodium chloride 3%  Inhalation 4 milliLiter(s) Inhalation every 6 hours    MEDICATIONS  (PRN):  acetaminophen    Suspension .. 650 milliGRAM(s) Oral every 6 hours PRN Temp greater or equal to 38C (100.4F), Mild Pain (1 - 3)      CAPILLARY BLOOD GLUCOSE    POCT Blood Glucose.: 176 mg/dL (29 Jan 2022 05:29)      I&O's Summary    28 Jan 2022 07:01  -  29 Jan 2022 07:00  --------------------------------------------------------  IN: 0 mL / OUT: 975 mL / NET: -975 mL        PHYSICAL EXAM:  Vital Signs Last 24 Hrs  T(C): 36.4 (29 Jan 2022 04:33), Max: 37.2 (28 Jan 2022 13:41)  T(F): 97.5 (29 Jan 2022 04:33), Max: 98.9 (28 Jan 2022 13:41)  HR: 93 (29 Jan 2022 04:33) (85 - 93)  BP: 111/66 (29 Jan 2022 04:33) (111/66 - 148/71)  RR: 18 (29 Jan 2022 04:33) (18 - 22)  SpO2: 95% (29 Jan 2022 04:33) (95% - 98%)    CONSTITUTIONAL: NAD, in bed lethargic with NRB  HEENT: NC/AT, EOMI  RESPIRATORY: No increased work of breathing, CTAB, no wheezes or crackles appreciated  CARDIOVASCULAR: RRR, S1 and S2 present, no m/r/g  ABDOMEN: soft, NT, ND, bowel sounds present  EXTREMITIES: No LE edema, no myoclonus present. in upper extremity mittens  MUSCULOSKELETAL: no joint swelling or tenderness to palpation  NEURO: A&Ox0, too lethargic to participate in exam, not responsive to verbal or tactile stimulation    LABS:                        13.0   14.36 )-----------( 312      ( 29 Jan 2022 10:28 )             42.9     01-29    146<H>  |  100  |  48<H>  ----------------------------<  169<H>  5.0   |  32<H>  |  1.10    Ca    10.8<H>      29 Jan 2022 07:27  Phos  5.4     01-29  Mg     3.0     01-29    Culture - Blood (collected 25 Jan 2022 13:34)  Source: .Blood Blood-Venous  Preliminary Report (26 Jan 2022 14:02):    No growth to date.    Culture - Blood (collected 25 Jan 2022 13:34)  Source: .Blood Blood-Peripheral  Preliminary Report (26 Jan 2022 14:02):    No growth to date.        RADIOLOGY & ADDITIONAL TESTS:  CT HEAD 1/27    This exam is compared prior head CT performed on January 23, 2020.    Again seen is abnormal high attenuation identified involving the high right frontal cortical subcortical region. This compatible with acute parenchymal hemorrhage surrounding edema. This acute parenchymal hemorrhage measures approximately 7.3 x 3.2 cm and previously measured approximately 7.7 x 3.5 cm. Localized mass effect is seen consisting of sulcal effacement.    Extra-axial low-attenuation with adjacent bony scalloping is again seen involving the high left frontal region. This finding measures approximately 4.2 x 2.8 cm and previously measured approximately 4.1 x 3.1 cm. This is compatible with an arachnoid cyst.    Parenchymal volume loss and chronic microvascular ischemic changes are identified.    Intraventricular hemorrhage is again seen.    Evaluation of structures with the appropriate window appears unremarkable    Left-sided NG tube is seen.    The visualized paranasal sinuses mastoid and middle ear regions appear clear.    Impression: Acute parenchymal hemorrhage is again seen with some expected evolutionary changes.    Arachnoid cyst again seen as described above.      CT CHEST 1/26  FINDINGS:    LYMPH NODES: Small mediastinal lymph nodes are similar in appearance to 1/15/2022.    HEART/VASCULATURE: Heart size is normal. Aortic valve and coronary artery calcifications.    AIRWAYS/LUNGS/PLEURA: Evaluation is limited secondary to respiratory motion artifact. Grossly patent central airways. New patchy opacities are seen in the dependent portions of the right upper lobe and bilateral lower lobes.    UPPER ABDOMEN:Enteric tube tip is in the proximal stomach just distal to the gastroesophageal junction. Ingested material is noted within the esophagus.    Redemonstrated atrophic pancreas with a 1 cm calcification in the head of the pancreas. Numerous additional calcifications suggest chronic pancreatitis.    Hepatic cysts adjacent to the gallbladder fossa. Trace layering densities within the gallbladder likely representing cholelithiasis.    BONES/SOFT TISSUES: Multiple old left rib fracture is. A 1.9 cm subpleural lipoma is noted adjacent to one of the old fractures. (3:65).    IMPRESSION:    1. New patchy bilateral dependent opacities, could occur in the setting of pneumonia (? Clinical history of aspiration given its distribution).      CXR 1/25  IMPRESSION: Patchy airspace opacities in the left lung, may represent asymmetric pulmonary edema or atypical/viral pneumonia.    CT HEAD 1/23  FINDINGS:  Large right frontal lobe parenchymal hematoma measures approximately 7.7 x 3.5 cm, previously 7.5 x 3.2 cm, with surrounding edema and mass effect, without interval interval change compared to prior. Redemonstrated small intraventricular hemorrhage, similar to prior.    Moderate cerebral volume loss with proportional prominence of the sulci and ventricles. Nonspecific white matter hypoattenuation, likely related to chronic microvascular changes.    Left frontal arachnoid cyst, unchanged.    Mucosal thickening of the left maxillary and bilateral ethmoid sinuses. Partially visualized nasogastric tube in situ. Mastoid air cells are clear.    Evidence of bilateral lens surgery.    No displaced calvarial fracture.      IMPRESSION:  Stable exam.

## 2024-07-10 NOTE — BRIEF OP NOTE
[facility]  [facility]rief [facility]ative [facility]    Surgery Date: 7/10/2024     Surgeons and Role:     * Kedar Jimenez MD - Primary    Assisting Surgeon: None    Pre-op Diagnosis:  Abnormal uterine bleeding [N93.9]  Status post endometrial ablation [Z98.890]    Post-op Diagnosis:  Post-Op Diagnosis Codes:     * Abnormal uterine bleeding [N93.9]     * Status post endometrial ablation [Z98.890]    Procedure(s) (LRB):  HYSTERECTOMY, TOTAL, LAPAROSCOPIC (N/A)    Anesthesia: General    Operative Findings: see op note    Estimated Blood Loss: * No values recorded between 7/10/2024  9:21 AM and 7/10/2024 10:10 AM *         Specimens:   Specimen (24h ago, onward)       Start     Ordered    07/10/24 0954  Specimen to Pathology Gynecology and Obstetrics  Once        References:    Click here for ordering Quick Tip   Question Answer Comment   Procedure Type: Gynecology and Obstetrics    Specimen Source Uterus UTERUS,CERVIX,BILATERAL FALLOPIAN TUBES   Procedure Type: Excision    Clinical Information: TLH,CYSTO    Release to patient Immediate        07/10/24 0954    07/10/24 0952  Specimen to Pathology  RELEASE UPON ORDERING        References:    Click here for ordering Quick Tip   Question:  Release to patient  Answer:  Immediate    07/10/24 0952                      Discharge Note    OUTCOME: Patient tolerated treatment/procedure well without complication and is now ready for discharge.    DISPOSITION: Home or Self Care    FINAL DIAGNOSIS:  Abnormal uterine bleeding    FOLLOWUP: In clinic    DISCHARGE INSTRUCTIONS:    Discharge Procedure Orders   Diet general     Call MD for:  temperature >100.4     Call MD for:  persistent nausea and vomiting     Call MD for:  severe uncontrolled pain     Call MD for:  difficulty breathing, headache or visual disturbances     Call MD for:  redness, tenderness, or signs of infection (pain, swelling, redness, odor or green/yellow discharge around incision site)     Call MD for:   hives     Call MD for:  persistent dizziness or light-headedness     Call MD for:  extreme fatigue

## 2024-07-10 NOTE — DISCHARGE INSTRUCTIONS
Dermabond dressing:  You may gently wash with soap and water, rinse and pat dry thoroughly.  Do not soak or scrub the areas.  Avoid topical medications, liquids, ointments, creams, petroleum jelly, or mineral oils.  Do not rub, scratch or pick at the wound.  Avoid tanning lamps or prolonged sunlight exposure.  Dermabond will slough off between 5-10 days.      Hysterectomy Postoperative Instructions    Diet: As tolerated   Decreased activity until follow up appointment.  No driving for 2 weeks, or while taking opioid pain medications.   Pelvic rest: no intercourse, tampons, or douching.  No heavy lifting or straining. No pushing or pulling. (10# limit)  Showers only, no tub baths.  Notify MD for:   excessive bright red vaginal bleeding  Fever above 100.4  pain unrelieved by medication  excessive nausea or vomiting  foul odor or drainage from incision site/vagina  redness or swelling around incision sites.

## 2024-07-10 NOTE — ANESTHESIA POSTPROCEDURE EVALUATION
Anesthesia Post Evaluation    Patient: Luz Barr    Procedure(s) Performed: Procedure(s) (LRB):  HYSTERECTOMY, TOTAL, LAPAROSCOPIC (N/A)    Final Anesthesia Type: general      Patient location during evaluation: PACU  Patient participation: Yes- Able to Participate  Level of consciousness: awake and alert and oriented  Post-procedure vital signs: reviewed and stable  Pain management: adequate  Airway patency: patent  JOSE mitigation strategies: Verification of full reversal of neuromuscular block  PONV status at discharge: No PONV  Anesthetic complications: no      Cardiovascular status: blood pressure returned to baseline and stable  Respiratory status: spontaneous ventilation and unassisted  Hydration status: euvolemic  Follow-up not needed.  Comments: [facility]            Vitals Value Taken Time   /77 07/10/24 1023   Temp 97.8 07/10/24 1024   Pulse 86 07/10/24 1023   Resp 16 07/10/24 1024   SpO2 100 % 07/10/24 1023   Vitals shown include unfiled device data.      No case tracking events are documented in the log.      Pain/Liz Score: No data recorded

## 2024-07-10 NOTE — ANESTHESIA PROCEDURE NOTES
Intubation    Date/Time: 7/10/2024 9:00 AM    Performed by: Willy Powell CRNA  Authorized by: Willy Powell CRNA    Intubation:     Induction:  Intravenous    Intubated:  Postinduction    Mask Ventilation:  Easy with oral airway    Attempts:  1    Attempted By:  CRNA    Method of Intubation:  Direct    Blade:  Cam 2    Laryngeal View Grade: Grade I - full view of cords      Difficult Airway Encountered?: No      Complications:  None    Airway Device:  Oral endotracheal tube    Airway Device Size:  7.0    Style/Cuff Inflation:  Cuffed (inflated to minimal occlusive pressure)    Inflation Amount (mL):  6    Tube secured:  21    Secured at:  The lips    Placement Verified By:  Capnometry    Complicating Factors:  None    Findings Post-Intubation:  BS equal bilateral and atraumatic/condition of teeth unchanged

## 2024-07-10 NOTE — OP NOTE
DATE OF PROCEDURE: 7/10/2024    PRE-OP DIAGNOSIS:  Abnormal uterine bleeding [N93.9]  Status post endometrial ablation [Z98.890]    POST-OP DIAGNOSIS:  Post-Op Diagnosis Codes:     * Abnormal uterine bleeding [N93.9]     * Status post endometrial ablation [Z98.890]    PROCEDURE:   Total Laparoscopic Hysterectomy   Bilateral Salpingectomy  Cystoscopy    SURGEON: Kedar Jimenez MD    ASSISTANT: Kathleen Paula NP    ANESTHESIA: General Endotracheal    ESTIMATED BLOOD LOSS: 25 cc    PROCEDURE IN DETAIL:  After consents were reviewed, patient was taken to the operating room where a time-out was held.  She was placed on the OR table, and after induction of anesthesia, placed in the dorsal lithotomy position in Cory stirrups and prepped and draped in standard sterile fashion.      An Advincula  3 cm uterine manipulator was placed in the cervix for uterine manipulation.  Attention was then turned to the abdomen.  A 1 cm incision was made in the skin just below the umbilicus.  The abdomen was tented up and a Veress needle was inserted.  The abdomen was then insufflated to 15 mmHg.  A 5 mm port was placed.  Camera was inserted.  Patient was placed in Trendelenburg position.  Two additional 5 mm ports were then placed to the left and right lower quadrants respectively.  Bowel was mobilized from the pelvis.  Findings included uterus, fallopian tubes status post tubal ligation, otherwise normal pelvic.      EnSeal device was used to excise the right fallopian tube from the mesosalpinx.  The right round ligament was identified and taken with Enseal device.  The anterior leaf of the right broad ligament was then incised and this dissection was carried around the lower uterine segment through the vesicouterine peritoneal fold.  The bladder was carefully dissected from the overlying lower uterine segment and cervix exposing the upper vagina.  Right uterine ovarian ligament and vessels were taken.  Subsequent bites were then  taken down the remaining right broad ligament until the right uterine artery could be identified isolated and taken.  The dissection was carried down to the upper vagina. This procedure was repeated for the left side.  Hemostasis was observed.  Monopolar cautery with a laparoscopic spatula was used to incise the upper vagina exposing the manipulator circumferentially around the cervix.  There uterus and Fallopian tubes were removed through the vagina.  The cervix was inspected and was removed in its entirety.  A lap sponge was placed in a blue glove and inserted into the vagina to maintain pneumoperitoneum.  The colpotomy was closed with a running 2-0 Stratafix absorbable suture.  The peritoneum over the cuff was closed with a 2 0 V lock suture.  The pelvis was irrigated and hemostasis was observed.  Peristalsis of both ureters was noted.  Gas was allowed to expel from the abdomen.  Ports were removed.  Skin was closed with 4-0 Monocryl.      A cystoscope was then inserted into the urethra and advanced into the bladder.  Normal bladder mucosa was noted and efflux was noted from bilateral ureteral orifices.  Cystoscope was removed bladder was drained and attention was returned to the abdomen.      Patient was awakened and taken to the recovery room in stable condition having tolerated the procedure very well.  Sponge lap needle count correct.    I agree with anesthesia's plan of care.

## 2024-07-12 LAB
BEAKER SEE SCANNED REPORT: NORMAL
BEAKER SEE SCANNED REPORT: NORMAL

## 2024-08-05 ENCOUNTER — TELEPHONE (OUTPATIENT)
Dept: OBSTETRICS AND GYNECOLOGY | Facility: CLINIC | Age: 39
End: 2024-08-05
Payer: MEDICAID

## 2024-08-06 ENCOUNTER — OFFICE VISIT (OUTPATIENT)
Dept: OBSTETRICS AND GYNECOLOGY | Facility: CLINIC | Age: 39
End: 2024-08-06
Payer: MEDICAID

## 2024-08-06 VITALS
TEMPERATURE: 98 F | SYSTOLIC BLOOD PRESSURE: 110 MMHG | DIASTOLIC BLOOD PRESSURE: 76 MMHG | BODY MASS INDEX: 28.63 KG/M2 | WEIGHT: 166.81 LBS

## 2024-08-06 DIAGNOSIS — Z09 POSTOP CHECK: Primary | ICD-10-CM

## 2024-08-06 PROBLEM — N93.9 ABNORMAL UTERINE BLEEDING: Status: RESOLVED | Noted: 2024-06-26 | Resolved: 2024-08-06

## 2024-08-06 PROCEDURE — 99024 POSTOP FOLLOW-UP VISIT: CPT | Mod: ,,, | Performed by: OBSTETRICS & GYNECOLOGY

## 2024-08-06 PROCEDURE — 3074F SYST BP LT 130 MM HG: CPT | Mod: CPTII,,, | Performed by: OBSTETRICS & GYNECOLOGY

## 2024-08-06 PROCEDURE — 3078F DIAST BP <80 MM HG: CPT | Mod: CPTII,,, | Performed by: OBSTETRICS & GYNECOLOGY

## 2024-08-06 PROCEDURE — 1159F MED LIST DOCD IN RCRD: CPT | Mod: CPTII,,, | Performed by: OBSTETRICS & GYNECOLOGY

## 2024-08-06 PROCEDURE — 4010F ACE/ARB THERAPY RXD/TAKEN: CPT | Mod: CPTII,,, | Performed by: OBSTETRICS & GYNECOLOGY

## 2024-09-13 ENCOUNTER — LAB VISIT (OUTPATIENT)
Dept: LAB | Facility: HOSPITAL | Age: 39
End: 2024-09-13
Attending: FAMILY MEDICINE
Payer: MEDICAID

## 2024-09-13 DIAGNOSIS — I10 ESSENTIAL HYPERTENSION, MALIGNANT: Primary | ICD-10-CM

## 2024-09-13 LAB
ALBUMIN SERPL-MCNC: 4 G/DL (ref 3.5–5)
ALBUMIN/GLOB SERPL: 1.4 RATIO (ref 1.1–2)
ALP SERPL-CCNC: 44 UNIT/L (ref 40–150)
ALT SERPL-CCNC: 15 UNIT/L (ref 0–55)
ANION GAP SERPL CALC-SCNC: 5 MEQ/L
AST SERPL-CCNC: 15 UNIT/L (ref 5–34)
BASOPHILS # BLD AUTO: 0.03 X10(3)/MCL
BASOPHILS NFR BLD AUTO: 0.4 %
BILIRUB SERPL-MCNC: 0.7 MG/DL
BUN SERPL-MCNC: 9 MG/DL (ref 7–18.7)
CALCIUM SERPL-MCNC: 8.9 MG/DL (ref 8.4–10.2)
CHLORIDE SERPL-SCNC: 108 MMOL/L (ref 98–107)
CHOLEST SERPL-MCNC: 197 MG/DL
CHOLEST/HDLC SERPL: 4 {RATIO} (ref 0–5)
CO2 SERPL-SCNC: 27 MMOL/L (ref 22–29)
CORTIS SERPL-SCNC: 14.4 UG/DL
CREAT SERPL-MCNC: 0.73 MG/DL (ref 0.55–1.02)
CREAT/UREA NIT SERPL: 12
EOSINOPHIL # BLD AUTO: 0.18 X10(3)/MCL (ref 0–0.9)
EOSINOPHIL NFR BLD AUTO: 2.5 %
ERYTHROCYTE [DISTWIDTH] IN BLOOD BY AUTOMATED COUNT: 12.7 % (ref 11.5–17)
GFR SERPLBLD CREATININE-BSD FMLA CKD-EPI: >60 ML/MIN/1.73/M2
GLOBULIN SER-MCNC: 2.8 GM/DL (ref 2.4–3.5)
GLUCOSE SERPL-MCNC: 90 MG/DL (ref 74–100)
HCT VFR BLD AUTO: 40.4 % (ref 37–47)
HDLC SERPL-MCNC: 44 MG/DL (ref 35–60)
HGB BLD-MCNC: 14 G/DL (ref 12–16)
IMM GRANULOCYTES # BLD AUTO: 0.01 X10(3)/MCL (ref 0–0.04)
IMM GRANULOCYTES NFR BLD AUTO: 0.1 %
IRON SERPL-MCNC: 67 UG/DL (ref 50–170)
LDLC SERPL CALC-MCNC: 138 MG/DL (ref 50–140)
LYMPHOCYTES # BLD AUTO: 2.57 X10(3)/MCL (ref 0.6–4.6)
LYMPHOCYTES NFR BLD AUTO: 36.4 %
MCH RBC QN AUTO: 28.6 PG (ref 27–31)
MCHC RBC AUTO-ENTMCNC: 34.7 G/DL (ref 33–36)
MCV RBC AUTO: 82.6 FL (ref 80–94)
MONOCYTES # BLD AUTO: 0.43 X10(3)/MCL (ref 0.1–1.3)
MONOCYTES NFR BLD AUTO: 6.1 %
NEUTROPHILS # BLD AUTO: 3.84 X10(3)/MCL (ref 2.1–9.2)
NEUTROPHILS NFR BLD AUTO: 54.5 %
PLATELET # BLD AUTO: 275 X10(3)/MCL (ref 130–400)
PMV BLD AUTO: 11.8 FL (ref 7.4–10.4)
POTASSIUM SERPL-SCNC: 3.5 MMOL/L (ref 3.5–5.1)
PROT SERPL-MCNC: 6.8 GM/DL (ref 6.4–8.3)
RBC # BLD AUTO: 4.89 X10(6)/MCL (ref 4.2–5.4)
SODIUM SERPL-SCNC: 140 MMOL/L (ref 136–145)
TRIGL SERPL-MCNC: 74 MG/DL (ref 37–140)
TSH SERPL-ACNC: 3.59 UIU/ML (ref 0.35–4.94)
VLDLC SERPL CALC-MCNC: 15 MG/DL
WBC # BLD AUTO: 7.06 X10(3)/MCL (ref 4.5–11.5)

## 2024-09-13 PROCEDURE — 80053 COMPREHEN METABOLIC PANEL: CPT

## 2024-09-13 PROCEDURE — 85025 COMPLETE CBC W/AUTO DIFF WBC: CPT

## 2024-09-13 PROCEDURE — 84443 ASSAY THYROID STIM HORMONE: CPT

## 2024-09-13 PROCEDURE — 82533 TOTAL CORTISOL: CPT

## 2024-09-13 PROCEDURE — 36415 COLL VENOUS BLD VENIPUNCTURE: CPT

## 2024-09-13 PROCEDURE — 83540 ASSAY OF IRON: CPT

## 2024-09-13 PROCEDURE — 80061 LIPID PANEL: CPT

## 2025-04-09 ENCOUNTER — OFFICE VISIT (OUTPATIENT)
Dept: OBSTETRICS AND GYNECOLOGY | Facility: CLINIC | Age: 40
End: 2025-04-09
Payer: MEDICAID

## 2025-04-09 VITALS
SYSTOLIC BLOOD PRESSURE: 114 MMHG | HEIGHT: 64 IN | WEIGHT: 136.19 LBS | BODY MASS INDEX: 23.25 KG/M2 | DIASTOLIC BLOOD PRESSURE: 82 MMHG

## 2025-04-09 DIAGNOSIS — N60.11 BILATERAL FIBROCYSTIC BREAST DISEASE (FCBD): ICD-10-CM

## 2025-04-09 DIAGNOSIS — N60.12 BILATERAL FIBROCYSTIC BREAST DISEASE (FCBD): ICD-10-CM

## 2025-04-09 DIAGNOSIS — Z01.411 ABNORMAL GYNECOLOGICAL EXAMINATION: Primary | ICD-10-CM

## 2025-04-09 PROCEDURE — 3074F SYST BP LT 130 MM HG: CPT | Mod: CPTII,,, | Performed by: NURSE PRACTITIONER

## 2025-04-09 PROCEDURE — 3008F BODY MASS INDEX DOCD: CPT | Mod: CPTII,,, | Performed by: NURSE PRACTITIONER

## 2025-04-09 PROCEDURE — 99395 PREV VISIT EST AGE 18-39: CPT | Mod: ,,, | Performed by: NURSE PRACTITIONER

## 2025-04-09 PROCEDURE — 1160F RVW MEDS BY RX/DR IN RCRD: CPT | Mod: CPTII,,, | Performed by: NURSE PRACTITIONER

## 2025-04-09 PROCEDURE — 3079F DIAST BP 80-89 MM HG: CPT | Mod: CPTII,,, | Performed by: NURSE PRACTITIONER

## 2025-04-09 PROCEDURE — 1159F MED LIST DOCD IN RCRD: CPT | Mod: CPTII,,, | Performed by: NURSE PRACTITIONER

## 2025-04-09 NOTE — PROGRESS NOTES
Chief Complaint: Annual exam    Chief Complaint   Patient presents with    Well Woman     Pt is here for annual GYN exam       HPI:   Luz Barr is a 39 y.o. year old  s/p TLH/Bilateral salpingectomy/cystoscopy on 7/10/2024 here for her Annual Exam. Denies complaints today.    Cancer-related family history includes Breast cancer (age of onset: 86) in her maternal grandmother; Skin cancer in her mother; Uterine cancer (age of onset: 27) in her mother. There is no history of Ovarian cancer or Cervical cancer.      Gyn History:    Menstrual History  Cycle: No  Menarche Age: 15 years  No Cycle Reason: (!) Surgical  Surgical Reason: hysterectomy    Menopause  Menopause Age: 39 years  Post Menopausal Bleeding: No  Hormone Replacement Therapy: No    Pap History  Last pap date: 24  Result: Normal (NIL -HR HPV)  History of Abnormal Pap: No  HPV Vaccine Completed: No    Pulpotio Bareas  Sexually Active: Yes  Sexual Orientation: heterosexual  Postcoital Bleeding: No  Dyspareunia: No  STI History: No  Contraception: No    Breast History  Last Breast Imaging Date: Yes  Date: 25 (Benign)  History of Abnormal Breast Imaging : (!) Yes (h/o breast tumors)  History of Breast Biopsy: Yes            History reviewed. No pertinent past medical history.    Past Surgical History:   Procedure Laterality Date    BREAST FIBROADENOMA SURGERY Left      SECTION      ,     CYSTOSCOPY N/A 7/10/2024    Procedure: CYSTOSCOPY;  Surgeon: Kedar Jimenez MD;  Location: [facility];  Service: OB/GYN;  Laterality: N/A;    DILATION AND CURETTAGE OF UTERUS      ,     ENDOMETRIAL ABLATION  2010    INCISIONAL BREAST BIOPSY Left 10/06/2022    per Dr. Devorah Tao - +Benign Tissue w/ Ductal Hyperplasia, Fibrosis and dilated ducts    KNEE CARTILAGE SURGERY Right     LAPAROSCOPIC SALPINGECTOMY Bilateral 7/10/2024    Procedure: SALPINGECTOMY, LAPAROSCOPIC;  Surgeon: Kedar Jimenez MD;  Location: [facility] OR;  Service:  OB/GYN;  Laterality: Bilateral;    LAPAROSCOPIC TOTAL HYSTERECTOMY N/A 7/10/2024    Procedure: HYSTERECTOMY, TOTAL, LAPAROSCOPIC;  Surgeon: Kedar Jimenez MD;  Location: [facility] OR;  Service: OB/GYN;  Laterality: N/A;    placement of breast device Left     TONSILLECTOMY AND ADENOIDECTOMY      TUBAL LIGATION       Current Medications[1]  Review of patient's allergies indicates:  No Known Allergies  OB History    Para Term  AB Living   4 2 2  2 2   SAB IAB Ectopic Multiple Live Births   1    2      # Outcome Date GA Lbr Yo/2nd Weight Sex Type Anes PTL Lv   4 AB 09 6w0d    SAB   ND   3 Term 06 38w0d  3.175 kg (7 lb) M CS-LTranv   CESAR   2 SAB 05 9w0d    SAB   ND   1 Term 03 41w0d  3.374 kg (7 lb 7 oz) F CS-LTranv   CESAR     Social History[2]  Family History   Problem Relation Name Age of Onset    Breast cancer Maternal Grandmother  86    Hypertension Father      Diabetes type II Father      Uterine cancer Mother  27    Skin cancer Mother      Hypertension Mother      Diabetes type II Mother      Colon cancer Neg Hx      Ovarian cancer Neg Hx      Cervical cancer Neg Hx         Review of Systems:   Review of Systems   Constitutional:  Negative for appetite change, chills, fatigue, fever and unexpected weight change.   Eyes:  Negative for visual disturbance.   Respiratory:  Negative for cough, shortness of breath and wheezing.    Cardiovascular:  Negative for chest pain, palpitations and leg swelling.   Gastrointestinal:  Negative for abdominal pain, bloating, blood in stool, constipation, diarrhea, nausea, vomiting, reflux and fecal incontinence.   Endocrine: Negative for hair loss and hot flashes.   Genitourinary:  Negative for bladder incontinence, decreased libido, dysmenorrhea, dyspareunia, dysuria, flank pain, frequency, genital sores, hematuria, hot flashes, menorrhagia, menstrual problem, pelvic pain, urgency, vaginal bleeding, vaginal discharge, vaginal pain, urinary  "incontinence, postcoital bleeding, postmenopausal bleeding, vaginal dryness and vaginal odor.   Integumentary:  Negative for rash, acne, hair changes, breast mass, nipple discharge, breast skin changes and breast tenderness.   Neurological:  Negative for headaches.   Psychiatric/Behavioral:  Negative for depression.    Breast: Negative for asymmetry, breast self exam, lump, mass, mastodynia, nipple discharge, skin changes and tenderness       Physical Exam:  /82 (BP Location: Right arm, Patient Position: Sitting)   Ht 5' 4" (1.626 m)   Wt 61.8 kg (136 lb 3.2 oz)   LMP 06/22/2024 (Exact Date)   BMI 23.38 kg/m²       Physical Exam:   Constitutional: She is oriented to person, place, and time. She appears well-developed and well-nourished.    HENT:   Head: Normocephalic.      Cardiovascular:       Exam reveals no edema.        Pulmonary/Chest: Effort normal. She exhibits no mass, no tenderness, no bony tenderness, no deformity and no retraction. Right breast exhibits no inverted nipple, no mass, no nipple discharge, no skin change, no tenderness, no bleeding, no swelling, no mastectomy, no augmentation and no lumpectomy. Left breast exhibits no inverted nipple, no mass, no nipple discharge, no skin change, no tenderness, no bleeding, no swelling, no mastectomy, no augmentation and no lumpectomy. Breasts are symmetrical.   Dense, fibrocystic breast         Abdominal: Soft. She exhibits no distension and no mass. There is no abdominal tenderness. There is no rebound and no guarding. No hernia. Hernia confirmed negative in the right inguinal area.     Genitourinary:    Inguinal canal, right adnexa, left adnexa and rectum normal.   Rectum:      No anal fissure or external hemorrhoid.   The external female genitalia was normal.   No external genitalia lesions identified,Genitalia hair distrobution normal .     Labial bartholins normal.There is no rash, tenderness, lesion or injury on the right labia. There is no " rash, tenderness, lesion or injury on the left labia. No no masses or organomegaly. Right adnexum displays no mass, no tenderness and no fullness. Left adnexum displays no mass, no tenderness and no fullness. Vagina exhibits no lesion. No erythema, vaginal discharge, tenderness, bleeding, rectocele, cystocele or prolapse of vaginal walls in the vagina.    No foreign body in the vagina.      No signs of injury in the vagina.   Vagina was moist.Cervix is absent.Uterus is absent. Normal urethral meatus.Urethral Meatus exhibits: no urethral lesionUrethra findings: no urethral mass, no tenderness and no prolapsedBladder findings: no bladder tenderness          Musculoskeletal: Normal range of motion.      Lymphadenopathy: No inguinal adenopathy noted on the right or left side.    Neurological: She is alert and oriented to person, place, and time.    Skin: Skin is warm and dry.    Psychiatric: She has a normal mood and affect. Her behavior is normal. Judgment and thought content normal.        Assessment:   Annual Well Women Exam  1. Bilateral fibrocystic breast disease (FCBD)  - Mammo Digital Screening Bilat w/ Zheng (XPD); Future    2. Abnormal gynecological examination        Plan:    Breast Self-awareness  Recommend exercise at least 3 times weekly  Healthy, balanced diet  Keep yearly follow up with PCP  No follow-ups on file.   Luz was seen today for well woman.    Diagnoses and all orders for this visit:    Abnormal gynecological examination    Bilateral fibrocystic breast disease (FCBD)  -     Mammo Digital Screening Bilat w/ Zheng (XPD); Future          Discussed limiting caffeine intake. NSAIDS for pain, compression bra. Instructed to do self breast exams.   MMG order placed     RTC prn/annual      Counseling:  A brief discussion of contraceptive choices and STD prevention was had.    Avoidance of cigarette smoking, alcohol use, and drug use was encouraged.    A healthy diet and regular exercise was stressed.     All questions were answered and the patient voiced understanding of the above issues.      This note was transcribed by Lucille Canales. There may be transcription errors as a result, however minimal. Effort has been made to ensure accuracy of transcription, but any obvious errors or omissions should be clarified with the author of the document.       I agree with the above documentation.              [1]   Current Outpatient Medications:     phentermine (ADIPEX-P) 37.5 mg tablet, Take 37.5 mg by mouth before breakfast. TAKES PRN, NOT ON A REGULAR BASIS, Disp: , Rfl:     tirzepatide (MOUNJARO SUBQ), Inject into the skin., Disp: , Rfl:     HYDROcodone-acetaminophen (NORCO) 7.5-325 mg per tablet, Take 1 tablet by mouth every 6 (six) hours as needed for Pain. (Patient not taking: Reported on 4/9/2025), Disp: 12 tablet, Rfl: 0  [2]   Social History  Tobacco Use    Smoking status: Never     Passive exposure: Never    Smokeless tobacco: Never   Substance Use Topics    Alcohol use: Never    Drug use: Never

## 2025-04-10 ENCOUNTER — HOSPITAL ENCOUNTER (OUTPATIENT)
Dept: RADIOLOGY | Facility: HOSPITAL | Age: 40
Discharge: HOME | End: 2025-04-10
Attending: NURSE PRACTITIONER
Payer: MEDICAID

## 2025-04-10 DIAGNOSIS — N60.11 BILATERAL FIBROCYSTIC BREAST DISEASE (FCBD): ICD-10-CM

## 2025-04-10 DIAGNOSIS — N60.12 BILATERAL FIBROCYSTIC BREAST DISEASE (FCBD): ICD-10-CM

## 2025-04-10 PROCEDURE — 77063 BREAST TOMOSYNTHESIS BI: CPT | Mod: TC

## 2025-04-17 ENCOUNTER — HOSPITAL ENCOUNTER (OUTPATIENT)
Dept: RADIOLOGY | Facility: HOSPITAL | Age: 40
Discharge: HOME | End: 2025-04-17
Attending: INTERNAL MEDICINE
Payer: MEDICAID

## 2025-04-17 ENCOUNTER — RESULTS FOLLOW-UP (OUTPATIENT)
Dept: OBSTETRICS AND GYNECOLOGY | Facility: CLINIC | Age: 40
End: 2025-04-17

## 2025-04-17 DIAGNOSIS — Z82.49 FAMILY HISTORY OF ISCHEMIC HEART DISEASE: ICD-10-CM

## 2025-04-17 PROCEDURE — 75571 CT HRT W/O DYE W/CA TEST: CPT | Mod: TC

## 2025-08-21 ENCOUNTER — OFFICE VISIT (OUTPATIENT)
Dept: OBSTETRICS AND GYNECOLOGY | Facility: CLINIC | Age: 40
End: 2025-08-21
Payer: MEDICAID

## 2025-08-21 VITALS
SYSTOLIC BLOOD PRESSURE: 112 MMHG | WEIGHT: 126 LBS | BODY MASS INDEX: 21.51 KG/M2 | DIASTOLIC BLOOD PRESSURE: 70 MMHG | HEIGHT: 64 IN

## 2025-08-21 DIAGNOSIS — Z80.3 FAMILY HISTORY OF BREAST CANCER: ICD-10-CM

## 2025-08-21 DIAGNOSIS — N63.12 MASS OF UPPER INNER QUADRANT OF RIGHT BREAST: Primary | ICD-10-CM

## 2025-08-21 PROCEDURE — 3074F SYST BP LT 130 MM HG: CPT | Mod: CPTII,,, | Performed by: NURSE PRACTITIONER

## 2025-08-21 PROCEDURE — 1159F MED LIST DOCD IN RCRD: CPT | Mod: CPTII,,, | Performed by: NURSE PRACTITIONER

## 2025-08-21 PROCEDURE — 3078F DIAST BP <80 MM HG: CPT | Mod: CPTII,,, | Performed by: NURSE PRACTITIONER

## 2025-08-21 PROCEDURE — 99214 OFFICE O/P EST MOD 30 MIN: CPT | Mod: ,,, | Performed by: NURSE PRACTITIONER

## 2025-08-21 PROCEDURE — 1160F RVW MEDS BY RX/DR IN RCRD: CPT | Mod: CPTII,,, | Performed by: NURSE PRACTITIONER

## 2025-08-21 PROCEDURE — 3008F BODY MASS INDEX DOCD: CPT | Mod: CPTII,,, | Performed by: NURSE PRACTITIONER

## (undated) DEVICE — MANIPULATOR UTERINE 3CM

## (undated) DEVICE — SUT V-LOC GRN 30CM 12IN 2-0

## (undated) DEVICE — SYS HYDRO-SURG IRR SMOKE EVAC

## (undated) DEVICE — SUT 2-0 CT-1 SPIRAL 12IN

## (undated) DEVICE — Device

## (undated) DEVICE — CATH FOLEY 16F COUNCIL STA LOC

## (undated) DEVICE — SYR ONLY LUER LOCK 20CC

## (undated) DEVICE — DEVICE ENSEAL X1 CRV JAW 37CM

## (undated) DEVICE — PAD PINK TRENDELENBURG POS XL

## (undated) DEVICE — IRRIGATOR ENDOSCOPY DISP.

## (undated) DEVICE — COVER PROXIMA MAYO STAND

## (undated) DEVICE — GOWN POLY REINF BRTH SLV 2XL

## (undated) DEVICE — DISSECTOR 5MM ENDOPATH

## (undated) DEVICE — SOL POVIDONE SCRUB IODINE 4 OZ

## (undated) DEVICE — DISSECTOR EPIX LAPA 5MMX35CM

## (undated) DEVICE — BLADE SURG CARBON STEEL SZ11

## (undated) DEVICE — TROCAR ENDO Z THREAD KII 5X100

## (undated) DEVICE — CHLORAPREP W TINT 26ML APPL

## (undated) DEVICE — FILTER LAPAROSCOPIC SMOKE EVAC

## (undated) DEVICE — TRAY DRY SKIN SCRUB PREP

## (undated) DEVICE — SUT MONOCYRL 4-0 PS2 UND

## (undated) DEVICE — SET CYSTO IRR DRP CHMBR 84IN

## (undated) DEVICE — GLOVE PROTEXIS PI SYN SURG 8.0

## (undated) DEVICE — SUT VICRYL+ 27 UR-6 VIOL

## (undated) DEVICE — DRAPE UNDERBUTTOCKS PCH STRL

## (undated) DEVICE — SLEEVE KII ADV FIX 5X100MM

## (undated) DEVICE — SCISSOR 5MMX35CM DIRECT DRIVE

## (undated) DEVICE — NDL INSUFFLATION VERRES 120MM

## (undated) DEVICE — DRAPE LEGGINGS CUFF 33X51IN

## (undated) DEVICE — ADHESIVE DERMABOND ADVANCED

## (undated) DEVICE — SOL POVIDONE PREP IODINE 4 OZ

## (undated) DEVICE — KIT PINK PAD ONE STEP W/ HDRST